# Patient Record
Sex: MALE | Race: WHITE | NOT HISPANIC OR LATINO | Employment: FULL TIME | URBAN - METROPOLITAN AREA
[De-identification: names, ages, dates, MRNs, and addresses within clinical notes are randomized per-mention and may not be internally consistent; named-entity substitution may affect disease eponyms.]

---

## 2023-12-11 ENCOUNTER — OFFICE VISIT (OUTPATIENT)
Dept: FAMILY MEDICINE | Facility: CLINIC | Age: 38
End: 2023-12-11
Payer: COMMERCIAL

## 2023-12-11 VITALS
BODY MASS INDEX: 16.11 KG/M2 | SYSTOLIC BLOOD PRESSURE: 123 MMHG | RESPIRATION RATE: 16 BRPM | HEIGHT: 78 IN | HEART RATE: 73 BPM | WEIGHT: 139.2 LBS | OXYGEN SATURATION: 97 % | DIASTOLIC BLOOD PRESSURE: 80 MMHG | TEMPERATURE: 98.4 F

## 2023-12-11 DIAGNOSIS — R30.0 DYSURIA: Primary | ICD-10-CM

## 2023-12-11 LAB
ALBUMIN UR-MCNC: NEGATIVE MG/DL
APPEARANCE UR: CLEAR
BILIRUB UR QL STRIP: NEGATIVE
COLOR UR AUTO: YELLOW
GLUCOSE UR STRIP-MCNC: NEGATIVE MG/DL
HGB UR QL STRIP: NEGATIVE
KETONES UR STRIP-MCNC: ABNORMAL MG/DL
LEUKOCYTE ESTERASE UR QL STRIP: NEGATIVE
NITRATE UR QL: NEGATIVE
PH UR STRIP: 6.5 [PH] (ref 5–8)
SP GR UR STRIP: 1.02 (ref 1–1.03)
UROBILINOGEN UR STRIP-ACNC: 0.2 E.U./DL

## 2023-12-11 PROCEDURE — 99203 OFFICE O/P NEW LOW 30 MIN: CPT | Mod: GC

## 2023-12-11 PROCEDURE — 81003 URINALYSIS AUTO W/O SCOPE: CPT

## 2023-12-11 PROCEDURE — 87086 URINE CULTURE/COLONY COUNT: CPT

## 2023-12-11 RX ORDER — PHENAZOPYRIDINE HYDROCHLORIDE 95 MG/1
190 TABLET ORAL 3 TIMES DAILY
COMMUNITY
End: 2023-12-13

## 2023-12-11 RX ORDER — SULFAMETHOXAZOLE/TRIMETHOPRIM 800-160 MG
1 TABLET ORAL 2 TIMES DAILY
COMMUNITY
End: 2023-12-13

## 2023-12-11 NOTE — PROGRESS NOTES
Preceptor Attestation:    I discussed the patient with the resident and evaluated the patient in person. I have verified the content of the note, which accurately reflects my assessment of the patient and the plan of care.   Supervising Physician:  Charles Kahn MD.

## 2023-12-11 NOTE — PROGRESS NOTES
"  Assessment & Plan     Dysuria  This problem started 10 days ago. It happened to follow a night of heavier drinking. No hx of UTIs. He is currently on day 3 of Bactrim DS prescribed over virtual visit and has not noticed any improvement. No discharge, fevers, or flank pain. Some abdominal tenderness on exam. Possibly has UTI. Also could be from urethral trauma as he is an avid cyclist. Plan to get UA with culture and continue with current course of antibiotics while results are pending.    - UA Macroscopic with reflex to Microscopic and Culture  - Urine Culture      Azeem Sol MD  [facility]    Reena Alves is a 38 year old, presenting for the following health issues:  establishing care (Establishing care for primary ) and UTI (Uti - taken meds 3 days where the symptoms have gotten worse - \"extreme frequency - in volunteerary leaking - a drop - no burning pain. When he moves there is some abdominal pressure where it squeezes something urine out. Wants to confirm if there is a UTI. )      12/11/2023     2:46 PM   Additional Questions   Roomed by AKIRA Lewis MA   Accompanied by Self       HPI       Genitourinary - Male  Onset/Duration: December 1st  Description:   Dysuria (painful urination): YES  Hematuria (blood in urine): No  Frequency: YES  Waking at night to urinate: YES  Hesitancy (delay in urine): No  Retention (unable to empty): No  Decrease in urinary flow: No  Incontinence: YES  Progression of Symptoms:  waxing and waning  Accompanying Signs & Symptoms:  Fever: No  Back/Flank pain: No  Urethral discharge: No  Testicle lumps/masses/pain: No  Nausea and/or vomiting: YES- nausea  Abdominal pain: YES  History:   History of frequent UTI s: No  History of kidney stones: No  History of hernias: No  Personal or Family history of Prostate problems: No  Sexually active: YES- female   Therapies tried and outcome: course of antibiotics - TMP-SMX DS and phenazopyridine           Objective  " "  /80   Pulse 73   Temp 98.4  F (36.9  C) (Oral)   Resp 16   Ht 1.981 m (6' 6\")   Wt 63.1 kg (139 lb 3.2 oz)   SpO2 97%   BMI 16.09 kg/m    Body mass index is 16.09 kg/m .  Physical Exam   GENERAL: healthy, alert and no distress  ABDOMEN: soft, nontender, without hepatosplenomegaly or masses and tenderness LUQ   (male): normal male genitalia without lesions or urethral discharge, no hernia    Results for orders placed or performed in visit on 12/11/23 (from the past 24 hour(s))   UA Macroscopic with reflex to Microscopic and Culture    Specimen: Urine, Midstream   Result Value Ref Range    Color Urine Yellow Colorless, Straw, Light Yellow, Yellow    Appearance Urine Clear Clear    Glucose Urine Negative Negative mg/dL    Bilirubin Urine Negative Negative    Ketones Urine Trace (A) Negative mg/dL    Specific Gravity Urine 1.025 1.005 - 1.030    Blood Urine Negative Negative    pH Urine 6.5 5.0 - 8.0    Protein Albumin Urine Negative Negative mg/dL    Urobilinogen Urine 0.2 0.2, 1.0 E.U./dL    Nitrite Urine Negative Negative    Leukocyte Esterase Urine Negative Negative    Narrative    Microscopic not indicated       ----- Service Performed and Documented by Resident or Fellow ------              "

## 2023-12-13 ENCOUNTER — OFFICE VISIT (OUTPATIENT)
Dept: FAMILY MEDICINE | Facility: CLINIC | Age: 38
End: 2023-12-13
Payer: COMMERCIAL

## 2023-12-13 VITALS
DIASTOLIC BLOOD PRESSURE: 76 MMHG | HEART RATE: 88 BPM | WEIGHT: 141 LBS | SYSTOLIC BLOOD PRESSURE: 126 MMHG | TEMPERATURE: 97 F | OXYGEN SATURATION: 99 % | BODY MASS INDEX: 16.31 KG/M2 | HEIGHT: 78 IN

## 2023-12-13 DIAGNOSIS — R11.0 NAUSEA: ICD-10-CM

## 2023-12-13 DIAGNOSIS — Z13.220 LIPID SCREENING: ICD-10-CM

## 2023-12-13 DIAGNOSIS — Z11.4 ENCOUNTER FOR SCREENING FOR HIV: ICD-10-CM

## 2023-12-13 DIAGNOSIS — R35.0 URINARY FREQUENCY: ICD-10-CM

## 2023-12-13 DIAGNOSIS — Z11.59 NEED FOR HEPATITIS C SCREENING TEST: ICD-10-CM

## 2023-12-13 DIAGNOSIS — Z11.59 NEED FOR HEPATITIS B SCREENING TEST: ICD-10-CM

## 2023-12-13 DIAGNOSIS — R63.0 DECREASED APPETITE: ICD-10-CM

## 2023-12-13 DIAGNOSIS — R10.12 ABDOMINAL PAIN, LEFT UPPER QUADRANT: Primary | ICD-10-CM

## 2023-12-13 DIAGNOSIS — E04.9 ENLARGED THYROID GLAND: ICD-10-CM

## 2023-12-13 LAB
BACTERIA UR CULT: NO GROWTH
BASOPHILS # BLD AUTO: 0 10E3/UL (ref 0–0.2)
BASOPHILS NFR BLD AUTO: 1 %
EOSINOPHIL # BLD AUTO: 0.1 10E3/UL (ref 0–0.7)
EOSINOPHIL NFR BLD AUTO: 1 %
ERYTHROCYTE [DISTWIDTH] IN BLOOD BY AUTOMATED COUNT: 12.1 % (ref 10–15)
HCT VFR BLD AUTO: 44 % (ref 40–53)
HGB BLD-MCNC: 15.3 G/DL (ref 13.3–17.7)
IMM GRANULOCYTES # BLD: 0 10E3/UL
IMM GRANULOCYTES NFR BLD: 0 %
LYMPHOCYTES # BLD AUTO: 1.1 10E3/UL (ref 0.8–5.3)
LYMPHOCYTES NFR BLD AUTO: 17 %
MCH RBC QN AUTO: 30.7 PG (ref 26.5–33)
MCHC RBC AUTO-ENTMCNC: 34.8 G/DL (ref 31.5–36.5)
MCV RBC AUTO: 88 FL (ref 78–100)
MONOCYTES # BLD AUTO: 0.5 10E3/UL (ref 0–1.3)
MONOCYTES NFR BLD AUTO: 7 %
NEUTROPHILS # BLD AUTO: 4.8 10E3/UL (ref 1.6–8.3)
NEUTROPHILS NFR BLD AUTO: 73 %
PLATELET # BLD AUTO: 221 10E3/UL (ref 150–450)
RBC # BLD AUTO: 4.99 10E6/UL (ref 4.4–5.9)
WBC # BLD AUTO: 6.6 10E3/UL (ref 4–11)

## 2023-12-13 PROCEDURE — 86376 MICROSOMAL ANTIBODY EACH: CPT

## 2023-12-13 PROCEDURE — 87389 HIV-1 AG W/HIV-1&-2 AB AG IA: CPT

## 2023-12-13 PROCEDURE — 86706 HEP B SURFACE ANTIBODY: CPT

## 2023-12-13 PROCEDURE — 99214 OFFICE O/P EST MOD 30 MIN: CPT | Mod: GC

## 2023-12-13 PROCEDURE — 86800 THYROGLOBULIN ANTIBODY: CPT

## 2023-12-13 PROCEDURE — 84436 ASSAY OF TOTAL THYROXINE: CPT

## 2023-12-13 PROCEDURE — 36415 COLL VENOUS BLD VENIPUNCTURE: CPT

## 2023-12-13 PROCEDURE — 80053 COMPREHEN METABOLIC PANEL: CPT

## 2023-12-13 PROCEDURE — 84480 ASSAY TRIIODOTHYRONINE (T3): CPT

## 2023-12-13 PROCEDURE — 86803 HEPATITIS C AB TEST: CPT

## 2023-12-13 PROCEDURE — 80061 LIPID PANEL: CPT

## 2023-12-13 PROCEDURE — 83690 ASSAY OF LIPASE: CPT

## 2023-12-13 PROCEDURE — 84443 ASSAY THYROID STIM HORMONE: CPT

## 2023-12-13 PROCEDURE — 86140 C-REACTIVE PROTEIN: CPT

## 2023-12-13 PROCEDURE — 85025 COMPLETE CBC W/AUTO DIFF WBC: CPT

## 2023-12-13 RX ORDER — FAMOTIDINE 20 MG/1
20 TABLET, FILM COATED ORAL 2 TIMES DAILY PRN
Qty: 30 TABLET | Refills: 0 | Status: SHIPPED | OUTPATIENT
Start: 2023-12-13 | End: 2024-03-20

## 2023-12-13 RX ORDER — CALCIUM CARBONATE 500 MG/1
2 TABLET, CHEWABLE ORAL 2 TIMES DAILY
Qty: 60 TABLET | Refills: 0 | Status: SHIPPED | OUTPATIENT
Start: 2023-12-13 | End: 2024-03-20

## 2023-12-13 NOTE — PROGRESS NOTES
Assessment & Plan     Urinary frequency  Abdominal pain, left upper quadrant  Nausea  Decreased appetite  Enlarged thyroid gland  Wide differential including but not limited to autoimmune thyroid etiology, GERD, gastritis, PUD, pancreatitis, with possible component of anemia given mucosal pallor on exam.  Will obtain workup as below, and given enlarged firm thyroid we will order thyroid US and obtain thyroiditis panel.  Do not suspect UTI given antibiotic treatment and negative UA, do not suspect STD as patient firmly denies exposure or new partners.  Do not suspect nephrolithiasis given lack of findings on UA and on exam.  Afebrile, vitally stable.  Provided patient with Tums and Pepto to use for symptomatic relief, and if those do not help then to trial famotidine.  Instructed patient with return precautions including worsening symptoms, fevers, and worsening abdominal pain and inability to have BM.  - CBC with Platelets & Differential; Future  - Comprehensive metabolic panel; Future  - Lipase; Future  - Helicobacter pylori Antigen Stool; Future  - calcium carbonate (TUMS) 500 MG chewable tablet; Take 2 tablets (1,000 mg) by mouth 2 times daily  - bismuth subsalicylate (PEPTO BISMOL) 262 MG/15ML suspension; Take 15 mLs by mouth every 6 hours as needed for indigestion  - famotidine (PEPCID) 20 MG tablet; Take 1 tablet (20 mg) by mouth 2 times daily as needed (As needed for abdominal symptoms)  - Helicobacter pylori Antigen Stool  - Lipase  - Comprehensive metabolic panel  - CBC with Platelets & Differential    Encounter for screening for HIV  - HIV Ag/Ab Screen Cascade    Need for hepatitis B screening test  - Hepatitis B Surface Antibody    Lipid screening  - Lipid panel reflex to direct LDL Non-fasting; Future  - Lipid panel reflex to direct LDL Non-fasting    Need for hepatitis C screening test  - Hepatitis C Screen Reflex to HCV RNA Quant and Genotype    Follow Ups:  Follow-up in approximately 1 month,  "follow-up to be guided by results of initial workup.      Tyler Johnson DO  [facility] ROSS Alves is a 38 year old, presenting for the following health issues:  UTI (On going uti symtoms. Nausea, urge to pee but nothing comes out, lots of pressure.)        12/13/2023     1:38 PM   Additional Questions   Roomed by ramirez   Accompanied by self       HPI   Patient is a 38-year-old male with no pertinent past medical history presenting for 12 days of increased urinary frequency, abdominal pain, nausea, chills, loose stools.  He additionally complains of urinary incontinence over this timeframe as well.    He was recently seen in clinic on 12/11/2023 for urinary frequency urinary urgency and what had already been on Bactrim therapy at that time.  UA was obtained which was grossly negative for infection and negative for growth on culture.    He states that for the past 12 days he has had urinary frequency, urinary urgency, abdominal pain described as a dull ache, increased sweatiness and shakiness, chills, nausea, and loose stools.  Unsure if there is any temporal association to meals.  Feels worse when he lays down.  Denies any burning sensation in the chest.  He also some very mild urinary continence that occurs when he bends forward or coughs but that it is never more than \"one drop\"    Denies new sexual partners.  Denies dysuria, testicular pain,  sores or lesions, hematuria, suprapubic tenderness, or CVA tenderness.  No fevers.    He feels that he has had some decreased appetite as well, and feels that he is disinterested in eating.  His stools are slightly worse and he feels that after going to the bathroom he needs to be careful or else he may have an episode of fecal incontinence 2/2 the urge to defecate.     He uses marijuana daily, uses a nicotine vape, and has 1 beer a night during the weekdays and on the weekends can vary from no drinking to heavy drinking.  Has been " "decreasing his marijuana use, nicotine use, and alcohol use 2/2 the symptoms he has been feeling.    Of note, he was asking his family regarding their medical history, and discovered that his mom and mom's brother both have Graves' disease, and 2 of his cousins on his mother side both have Hashimoto's thyroiditis.      Review of Systems   Constitutional, HEENT, cardiovascular, pulmonary, gi and gu systems are negative, except as otherwise noted.      Objective    /76 (BP Location: Left arm, Patient Position: Sitting, Cuff Size: Adult Regular)   Pulse 88   Temp 97  F (36.1  C) (Tympanic)   Ht 1.981 m (6' 5.99\")   Wt 64 kg (141 lb)   SpO2 99%   BMI 16.30 kg/m    Body mass index is 16.3 kg/m .  Physical Exam   Constitutional: Awake, alert, cooperative, no apparent distress, and appears stated age.  Eyes: Lids and lashes normal, sclera clear.  Mild conjunctival pallor.  ENT: Normocephalic, atraumatic. Moist mucous membranes.  Thyroid nontender to palpation.  Thyroid mildly enlarged and firm, moves easily with swallowing.  Respiratory: No increased work of breathing, no accessory muscle use, clear to auscultation bilaterally, no crackles or wheezing.  Cardiovascular: Regular rate and rhythm, normal S1 and S2, and no murmur noted  GI: Tenderness to palpation LUQ, periumbilical region.  Abdomen soft, nondistended, without rigidity.  No peritoneal signs.  Negative Belcher's and Kenney's.  Negative Carnett.  Skin: No bruising or bleeding.  Reddened skin to the anterior neck.  Musculoskeletal: There is no redness, warmth, or swelling of the joints.  Full range of motion noted.      "

## 2023-12-13 NOTE — PROGRESS NOTES
Preceptor Attestation:    I discussed the patient with the resident and evaluated the patient in person. I have verified the content of the note, which accurately reflects my assessment of the patient and the plan of care.   Supervising Physician:  Ruperto Evans MD.        BP Readings from Last 3 Encounters:   12/13/23 126/76   12/11/23 123/80    Wt Readings from Last 3 Encounters:   12/13/23 64 kg (141 lb)   12/11/23 63.1 kg (139 lb 3.2 oz)

## 2023-12-13 NOTE — PATIENT INSTRUCTIONS
Thank you for visiting our clinic today. As we discussed;    1) Try taking tums (1000mg as needed twice per day) and pepto bismol   2) If the above doesn't help, try taking famotidine 20mg once per day  3) Please be seen in clinic or in the ER if symptoms worsen, fevers develop, or abdominal pain worsens and you are not having bowel movements.  4) You can  OTC Gas-X (simethicone) for any bloating or burping.    Please feel free to call the clinic with any questions or concerns at [phone], or send a Qriously message and we will get back to you as soon as we can.

## 2023-12-14 LAB
ALBUMIN SERPL BCG-MCNC: 5.1 G/DL (ref 3.5–5.2)
ALP SERPL-CCNC: 46 U/L (ref 40–150)
ALT SERPL W P-5'-P-CCNC: 18 U/L (ref 0–70)
ANION GAP SERPL CALCULATED.3IONS-SCNC: 9 MMOL/L (ref 7–15)
AST SERPL W P-5'-P-CCNC: 18 U/L (ref 0–45)
BILIRUB SERPL-MCNC: 0.5 MG/DL
BUN SERPL-MCNC: 13.5 MG/DL (ref 6–20)
CALCIUM SERPL-MCNC: 9.7 MG/DL (ref 8.6–10)
CHLORIDE SERPL-SCNC: 103 MMOL/L (ref 98–107)
CHOLEST SERPL-MCNC: 166 MG/DL
CREAT SERPL-MCNC: 0.95 MG/DL (ref 0.67–1.17)
CRP SERPL-MCNC: <3 MG/L
DEPRECATED HCO3 PLAS-SCNC: 27 MMOL/L (ref 22–29)
EGFRCR SERPLBLD CKD-EPI 2021: >90 ML/MIN/1.73M2
FASTING STATUS PATIENT QL REPORTED: NORMAL
GLUCOSE SERPL-MCNC: 90 MG/DL (ref 70–99)
HBV SURFACE AB SERPL IA-ACNC: 33.12 M[IU]/ML
HBV SURFACE AB SERPL IA-ACNC: REACTIVE M[IU]/ML
HCV AB SERPL QL IA: NONREACTIVE
HDLC SERPL-MCNC: 64 MG/DL
HIV 1+2 AB+HIV1 P24 AG SERPL QL IA: NONREACTIVE
LDLC SERPL CALC-MCNC: 91 MG/DL
LIPASE SERPL-CCNC: 33 U/L (ref 13–60)
NONHDLC SERPL-MCNC: 102 MG/DL
POTASSIUM SERPL-SCNC: 4.6 MMOL/L (ref 3.4–5.3)
PROT SERPL-MCNC: 7.9 G/DL (ref 6.4–8.3)
SODIUM SERPL-SCNC: 139 MMOL/L (ref 135–145)
T3 SERPL-MCNC: 82 NG/DL (ref 85–202)
T4 SERPL-MCNC: 7.7 UG/DL (ref 4.5–11.7)
THYROGLOB AB SERPL IA-ACNC: <20 IU/ML
THYROPEROXIDASE AB SERPL-ACNC: <10 IU/ML
TRIGL SERPL-MCNC: 55 MG/DL
TSH SERPL DL<=0.005 MIU/L-ACNC: 1.12 UIU/ML (ref 0.3–4.2)

## 2023-12-15 PROCEDURE — 87338 HPYLORI STOOL AG IA: CPT

## 2023-12-18 ENCOUNTER — LAB (OUTPATIENT)
Dept: LAB | Facility: CLINIC | Age: 38
End: 2023-12-18
Payer: COMMERCIAL

## 2023-12-18 DIAGNOSIS — R35.0 URINARY FREQUENCY: ICD-10-CM

## 2023-12-18 DIAGNOSIS — R10.12 ABDOMINAL PAIN, LEFT UPPER QUADRANT: ICD-10-CM

## 2023-12-18 DIAGNOSIS — R19.7 DIARRHEA, UNSPECIFIED TYPE: ICD-10-CM

## 2023-12-18 DIAGNOSIS — R19.7 DIARRHEA, UNSPECIFIED TYPE: Primary | ICD-10-CM

## 2023-12-18 LAB — H PYLORI AG STL QL IA: NEGATIVE

## 2023-12-19 LAB
ADV 40+41 DNA STL QL NAA+NON-PROBE: NEGATIVE
ALBUMIN UR-MCNC: NEGATIVE MG/DL
APPEARANCE UR: CLEAR
ASTRO TYP 1-8 RNA STL QL NAA+NON-PROBE: NEGATIVE
BACTERIA #/AREA URNS HPF: ABNORMAL /HPF
BILIRUB UR QL STRIP: NEGATIVE
C CAYETANENSIS DNA STL QL NAA+NON-PROBE: NEGATIVE
C DIFF TOX B STL QL: NEGATIVE
CAMPYLOBACTER DNA SPEC NAA+PROBE: NEGATIVE
CAOX CRY #/AREA URNS HPF: ABNORMAL /HPF
COLOR UR AUTO: YELLOW
CRYPTOSP DNA STL QL NAA+NON-PROBE: NEGATIVE
E COLI O157 DNA STL QL NAA+NON-PROBE: NORMAL
E HISTOLYT DNA STL QL NAA+NON-PROBE: NEGATIVE
EAEC ASTA GENE ISLT QL NAA+PROBE: NEGATIVE
EC STX1+STX2 GENES STL QL NAA+NON-PROBE: NEGATIVE
EPEC EAE GENE STL QL NAA+NON-PROBE: NEGATIVE
ETEC LTA+ST1A+ST1B TOX ST NAA+NON-PROBE: NEGATIVE
G LAMBLIA DNA STL QL NAA+NON-PROBE: NEGATIVE
GLUCOSE UR STRIP-MCNC: NEGATIVE MG/DL
HGB UR QL STRIP: NEGATIVE
KETONES UR STRIP-MCNC: NEGATIVE MG/DL
LEUKOCYTE ESTERASE UR QL STRIP: NEGATIVE
MUCOUS THREADS #/AREA URNS LPF: PRESENT /LPF
NITRATE UR QL: NEGATIVE
NOROVIRUS GI+II RNA STL QL NAA+NON-PROBE: NEGATIVE
P SHIGELLOIDES DNA STL QL NAA+NON-PROBE: NEGATIVE
PH UR STRIP: 6 [PH] (ref 5–8)
RBC #/AREA URNS AUTO: ABNORMAL /HPF
RVA RNA STL QL NAA+NON-PROBE: NEGATIVE
SALMONELLA SP RPOD STL QL NAA+PROBE: NEGATIVE
SAPO I+II+IV+V RNA STL QL NAA+NON-PROBE: NEGATIVE
SHIGELLA SP+EIEC IPAH ST NAA+NON-PROBE: NEGATIVE
SP GR UR STRIP: >=1.03 (ref 1–1.03)
SQUAMOUS #/AREA URNS AUTO: ABNORMAL /LPF
UROBILINOGEN UR STRIP-ACNC: 0.2 E.U./DL
V CHOLERAE DNA SPEC QL NAA+PROBE: NEGATIVE
VIBRIO DNA SPEC NAA+PROBE: NEGATIVE
WBC #/AREA URNS AUTO: ABNORMAL /HPF
Y ENTEROCOL DNA STL QL NAA+PROBE: NEGATIVE

## 2023-12-19 PROCEDURE — 87507 IADNA-DNA/RNA PROBE TQ 12-25: CPT

## 2023-12-19 PROCEDURE — 87493 C DIFF AMPLIFIED PROBE: CPT | Mod: 59

## 2023-12-19 PROCEDURE — 87086 URINE CULTURE/COLONY COUNT: CPT

## 2023-12-19 PROCEDURE — 81001 URINALYSIS AUTO W/SCOPE: CPT

## 2023-12-20 LAB — BACTERIA UR CULT: NO GROWTH

## 2023-12-22 DIAGNOSIS — R10.84 ABDOMINAL PAIN, GENERALIZED: ICD-10-CM

## 2023-12-22 DIAGNOSIS — N39.3 STRESS INCONTINENCE OF URINE: Primary | ICD-10-CM

## 2023-12-27 ENCOUNTER — OFFICE VISIT (OUTPATIENT)
Dept: FAMILY MEDICINE | Facility: CLINIC | Age: 38
End: 2023-12-27
Payer: COMMERCIAL

## 2023-12-27 ENCOUNTER — HOSPITAL ENCOUNTER (OUTPATIENT)
Dept: ULTRASOUND IMAGING | Facility: CLINIC | Age: 38
Discharge: HOME | End: 2023-12-27
Attending: FAMILY MEDICINE | Admitting: FAMILY MEDICINE
Payer: COMMERCIAL

## 2023-12-27 VITALS
HEIGHT: 73 IN | HEART RATE: 59 BPM | DIASTOLIC BLOOD PRESSURE: 80 MMHG | TEMPERATURE: 97.6 F | BODY MASS INDEX: 18.87 KG/M2 | SYSTOLIC BLOOD PRESSURE: 123 MMHG | OXYGEN SATURATION: 100 % | RESPIRATION RATE: 18 BRPM | WEIGHT: 142.4 LBS

## 2023-12-27 DIAGNOSIS — N41.0 PROSTATITIS, ACUTE: Primary | ICD-10-CM

## 2023-12-27 DIAGNOSIS — R10.2 PERINEAL PAIN IN MALE: ICD-10-CM

## 2023-12-27 DIAGNOSIS — E04.9 ENLARGED THYROID GLAND: ICD-10-CM

## 2023-12-27 DIAGNOSIS — R30.0 DYSURIA: ICD-10-CM

## 2023-12-27 PROCEDURE — 99214 OFFICE O/P EST MOD 30 MIN: CPT | Mod: GC

## 2023-12-27 PROCEDURE — 76536 US EXAM OF HEAD AND NECK: CPT

## 2023-12-27 RX ORDER — CIPROFLOXACIN 500 MG/1
500 TABLET, FILM COATED ORAL 2 TIMES DAILY
Qty: 56 TABLET | Refills: 0 | Status: SHIPPED | OUTPATIENT
Start: 2023-12-27 | End: 2024-03-20

## 2023-12-27 RX ORDER — CIPROFLOXACIN 500 MG/1
500 TABLET, FILM COATED ORAL 2 TIMES DAILY
Qty: 56 TABLET | Refills: 0 | Status: SHIPPED | OUTPATIENT
Start: 2023-12-27 | End: 2023-12-27

## 2023-12-27 NOTE — PROGRESS NOTES
Assessment & Plan     Prostatitis, acute  Dysuria  Perineal pain in male  Symptoms consistent with prostatitis, see digital rectal exam findings below.  Will begin treatment with ciprofloxacin for 4 weeks and then have patient follow-up to reevaluate symptoms.  Previous workup has been largely negative, and although there was few calcium oxalate crystals on his previous UA I do not currently suspect nephrolithiasis as a cause for his symptoms.  Will still have patient schedule follow-up with urology should antibiotic treatment fail.  - Adult Urology  Referral; Future  - ciprofloxacin (CIPRO) 500 MG tablet; Take 1 tablet (500 mg) by mouth 2 times daily      Return in about 4 weeks (around 1/24/2024) for Follow up for prostatitis.    Tyler Johnson [facility]    Reena Alves is a 38 year old, presenting for the following health issues:  Results (F/U)      12/27/2023     4:10 PM   Additional Questions   Roomed by ANSLEY   Accompanied by self       HPI     Patient is a 38-year-old male returning to clinic for follow-up of urinary urgency/frequency, abdominal pain, nausea, and new onset urinary incontinence.  Symptoms started 12/1/2023.  Initially started as urinary urgency and increased urinary frequency, had a UA performed that was negative for infection was treated empirically with Bactrim.    Followed up on 12/13/2023 where he began complaining of new GI and systemic symptoms including night sweats, loose stools, nausea, decreased appetite, and reported strong family history of graves disease and Hashimoto's thyroiditis on the maternal side of his family.  Workup obtained at that time was negative for any infectious process or any evidence of thyroid disease.  Thyroid ultrasound ordered and was normal.    He returns today and states that some of his symptoms have improved -he is no longer feeling nauseous and appetite is improved, his abdominal pain has improved and is no  "longer constant, comes and goes, and he more so describes as a pressure.  Night sweats and general malaise have resolved as well.    He is still complaining of increased urinary frequency and urinary urgency.  Additionally, he now complains of a fullness and tenderness to his perennial area that is worsened when he is sitting or laying down.  Still having urinary incontinence, worse with with position changes, with dribbling and has had to place an incontinence pad in his underwear for this.  Describes the pain and swelling in his perineum as if \"there is a golf ball sitting there\".  No fevers or chills.  No hematuria, testicular pain, suprapubic tenderness, CVA tenderness.    Review of Systems   Constitutional, HEENT, cardiovascular, pulmonary, gi and gu systems are negative, except as otherwise noted.      Objective    /80 (BP Location: Left arm, Patient Position: Sitting, Cuff Size: Adult Regular)   Pulse 59   Temp 97.6  F (36.4  C) (Oral)   Resp 18   Ht 1.854 m (6' 1\")   Wt 64.6 kg (142 lb 6.4 oz)   SpO2 100%   BMI 18.79 kg/m    Body mass index is 18.79 kg/m .  Physical Exam   Constitutional: Awake, alert, cooperative, no apparent distress, and appears stated age.  Eyes: Lids and lashes normal, sclera clear, conjunctiva normal.  ENT: Normocephalic, atraumatic. Moist mucous membranes.  Respiratory: No increased work of breathing, no accessory muscle use, clear to auscultation bilaterally, no crackles or wheezing.  Cardiovascular: Regular rate and rhythm, normal S1 and S2, no S3 or S4, and no murmur noted  GI: Abdomen soft, non-tender, non-distended, no masses palpated. Bowel sounds present.  Skin: No bruising or bleeding and normal skin color, texture, turgor.    Digital Rectal Exam: Boggy, enlarged prostate that is tender to palpation.            "

## 2023-12-27 NOTE — PROGRESS NOTES
Preceptor Attestation:    I discussed the patient with the resident and evaluated the patient in person. I have verified the content of the note, which accurately reflects my assessment of the patient and the plan of care.   Supervising Physician:  Ever Mahoney MD.

## 2023-12-31 ENCOUNTER — HEALTH MAINTENANCE LETTER (OUTPATIENT)
Age: 38
End: 2023-12-31

## 2024-01-03 DIAGNOSIS — R30.0 DYSURIA: ICD-10-CM

## 2024-01-03 DIAGNOSIS — N41.0 PROSTATITIS, ACUTE: Primary | ICD-10-CM

## 2024-01-03 RX ORDER — TAMSULOSIN HYDROCHLORIDE 0.4 MG/1
0.4 CAPSULE ORAL DAILY
Qty: 30 CAPSULE | Refills: 0 | Status: SHIPPED | OUTPATIENT
Start: 2024-01-03 | End: 2024-03-15

## 2024-01-23 ENCOUNTER — OFFICE VISIT (OUTPATIENT)
Dept: FAMILY MEDICINE | Facility: CLINIC | Age: 39
End: 2024-01-23
Payer: COMMERCIAL

## 2024-01-23 VITALS
DIASTOLIC BLOOD PRESSURE: 78 MMHG | HEART RATE: 61 BPM | RESPIRATION RATE: 16 BRPM | WEIGHT: 147.2 LBS | TEMPERATURE: 97.8 F | BODY MASS INDEX: 19.51 KG/M2 | SYSTOLIC BLOOD PRESSURE: 117 MMHG | HEIGHT: 73 IN | OXYGEN SATURATION: 99 %

## 2024-01-23 DIAGNOSIS — Z23 NEED FOR PROPHYLACTIC VACCINATION AND INOCULATION AGAINST INFLUENZA: ICD-10-CM

## 2024-01-23 DIAGNOSIS — N41.0 PROSTATITIS, ACUTE: Primary | ICD-10-CM

## 2024-01-23 DIAGNOSIS — N39.41 URGE INCONTINENCE OF URINE: ICD-10-CM

## 2024-01-23 DIAGNOSIS — R35.0 URINARY FREQUENCY: ICD-10-CM

## 2024-01-23 PROCEDURE — 99213 OFFICE O/P EST LOW 20 MIN: CPT | Mod: 25

## 2024-01-23 PROCEDURE — 90480 ADMN SARSCOV2 VAC 1/ONLY CMP: CPT

## 2024-01-23 PROCEDURE — 90686 IIV4 VACC NO PRSV 0.5 ML IM: CPT

## 2024-01-23 PROCEDURE — 91320 SARSCV2 VAC 30MCG TRS-SUC IM: CPT

## 2024-01-23 PROCEDURE — 90471 IMMUNIZATION ADMIN: CPT

## 2024-01-23 NOTE — PROGRESS NOTES
"Preceptor attestation:  Vital signs reviewed: /78   Pulse 61   Temp 97.8  F (36.6  C) (Oral)   Resp 16   Ht 1.854 m (6' 1\")   Wt 66.8 kg (147 lb 3.2 oz)   SpO2 99%   BMI 19.42 kg/m      Patient seen, evaluated, and discussed with the resident.  I verified the content of the note, which accurately reflects my assessment of the patient and the plan of care.    Supervising physician: Michelle Hinton MD  [facility]  "

## 2024-01-23 NOTE — PROGRESS NOTES
Assessment & Plan     Prostatitis, acute  Incontinence of urine  Urinary frequency  Patient's symptoms and previous digital rectal exam seemed suspicious of acute prostatitis which has now been treated with 4 weeks of ciprofloxacin. He has had improvement of the systemic symptoms of subjective fevers, chills and nausea. His urinary symptoms of urgency and intermittent incontinence especially with position changes continue but are somewhat improved since last visit. Additionally patient reports that some of his urinary symptoms/incontinence has a chronic component to it ongoing for many years. Because we have seen some improvement of his symptoms over the past month especially the more systemic ones, it seems as though the infectious component of the disease may have been treated. There is still concern that a structural issue within the urethra/bladder may be playing a factor especially because the patient endorses some chronic component to his symptoms. He has a follow up with urology next month for further evaluation. He will finish his course of ciprofloxacin and take tamulosin for symptom relief in the meantime.     Need for prophylactic vaccination and inoculation against influenza  COVID and flu vaccinations given.      Derek Godfrey MS3    Resident/Fellow Attestation   I, Tyler Johnson DO, was present with the medical/GUI student who participated in the service and in the documentation of the note.  I have verified the history and personally performed the physical exam and medical decision making.  I agree with the assessment and plan of care as documented in the note.        Tyler Johnson DO  PGY2  Date of Service (when I saw the patient): 01/23/24       Reena Alves is a 38 year old, presenting for the following health issues:  Follow Up (Follow up last visit for prostatitis antibodies med)        1/23/2024     8:10 AM   Additional Questions   Roomed by radha   Accompanied by self     LUPE Alves  "is a 38 y.o. male presenting to clinic for acute prostatitis follow up. His acute symptoms started at the beginning of December 2023 with significant urinary urgency and frequency, abdominal fullness, signs of overflow incontinence (leakage of urine with with positional changes) as well as systemic symptoms of subjective fevers, chills, nausea, abdominal discomfort and loose stools. His UA was negative and he had a digital rectal exam that revealed a tender and enlarged prostate. He was prescribed ciprofloxacin which he has been taking as prescribed. He is now feeling improvement of symptoms with no other systemic fevers, chills or persistent nausea, but continues to have urinary urgency/frequency and incontinence with little dribbles of urine especially when sitting or with position changes. He continues to feel lower abdominal fullness but states that the rectal/perineal fullness is improved but not resolved. He has not been taking his tamulosin due to no wanting to mask symptoms.    Additionally the patient mentions that he believes some of his symptoms of incontinence and urgency have been ongoing for many years stating that in the past prior to this acute episode he would often have significant urinary urgency after having a bowel movement and occasionally would have incontinence with dribbling of urine. His acute symptoms were much more prominent, but states this has been ongoing for some time.        Objective    /78   Pulse 61   Temp 97.8  F (36.6  C) (Oral)   Resp 16   Ht 1.854 m (6' 1\")   Wt 66.8 kg (147 lb 3.2 oz)   SpO2 99%   BMI 19.42 kg/m    Body mass index is 19.42 kg/m .  Physical Exam   GENERAL: alert and no distress  RESP: lungs clear to auscultation - no rales, rhonchi or wheezes  CV: regular rate and rhythm, normal S1 S2, no S3 or S4, no murmur, click or rub, no peripheral edema  ABDOMEN: soft, nontender, no hepatosplenomegaly, no masses and bowel sounds normal  MS: no gross " musculoskeletal defects noted, no edema              Signed Electronically by: Tyler Johnson,

## 2024-01-25 NOTE — TELEPHONE ENCOUNTER
MEDICAL RECORDS REQUEST   [facility]  Urology Clinic  [address] [place] [address]  PHONE: [phone]  Fax: [phone]        FUTURE VISIT INFORMATION                                                   [place], : 1985 scheduled for future visit at [facility]    APPOINTMENT INFORMATION:  Date: 2024  Provider:  Gusatvo Goins PA-C  Reason for Visit/Diagnosis: Clinical suspicion for prostatitis, treating with cipro    REFERRAL INFORMATION:  Referring provider:  Ever Mahoney MD in [facility]      RECORDS REQUESTED FOR VISIT                                                     NOTES  STATUS/DETAILS   OFFICE NOTE from other specialist  yes, 2024, 2023 -- Tyler Johnson DO @ [facility]   MEDICATION LIST  yes   LABS     URINALYSIS (UA)  yes   IMAGES  no     PRE-VISIT CHECKLIST      Joint diagnostic appointment coordinated correctly          (ensure right order & amount of time) Yes   RECORD COLLECTION COMPLETE Yes

## 2024-01-26 NOTE — PROGRESS NOTES
"Subjective     REQUESTING PROVIDER  Dr. Ever Mahoney MD    REASON FOR VISIT  Irritative lower urinary tract symptoms    HISTORY OF PRESENT ILLNESS  Mr. Dixon is a pleasant 38 year old male with no significant past medical history who presents today for chronic bothersome lower urinary tract symptoms.  I personally reviewed the family practice visit notes from 12/13/2023, 12/27/2023, and 1/23/2024 in preparation for today's visit.    According to those notes, Long endorsed a worsening of his baseline somewhat bothersome irritative lower urinary tract symptoms alongside subjective fever, pain with urination, and perineal discomfort.  Prostate exam completed on 12/27/2023 showed a boggy enlarged prostate that was tender to palpation.  With this in mind he was put on a 4-week course of ciprofloxacin, and endorses improvement in a good number of his urinary symptoms.  However, at his visit on 1/23/2024 he noted that he had some chronic urinary symptoms he was hoping to discuss so he was referred to urology.    Today:  First symptoms starting in mid 20s - post defecation pelvic pain and post-void dribbling  Post void dribbling really only happened if he sat after a bowel movement   Worsening nocturia is what led to the diagnosis of prostatitis   Biggest complaint now is constant urge to urinate and the above mentioned post-bowel movement symptoms   Some pressure of the suprapubic area now   Lots of GI upset after the 4 week course of antibiotics   Fluids:  Coffee: 4 oz  Water: 40 - 60 oz   Occasional cocktail   Denies any regular pelvic pain  Endorses that ejaculation can cause a \"twitch\" in his right leg  No gross hematuria     REVIEW OF SYSTEMS  Review of Systems   Constitutional:  Negative for activity change and unexpected weight change.   HENT:  Negative for ear pain and tinnitus.    Eyes:  Negative for visual disturbance.   Respiratory:  Negative for shortness of breath.    Cardiovascular:  Negative for chest " pain.   Gastrointestinal:  Positive for abdominal pain (More of a bloating or pressure feeling).   Genitourinary:  Negative for hematuria.   Musculoskeletal:  Negative for back pain (Chronic in nature).   Neurological:  Negative for dizziness and light-headedness.   Hematological:  Negative for adenopathy.   Psychiatric/Behavioral:  Negative for sleep disturbance.      Social History:  Quit smoking 2 years ago, stopped vaping a few weeks ago    Family History:  Denies any known family history of urologic malignancy     Objective     PHYSICAL EXAM  Physical Exam  Constitutional:       Appearance: Normal appearance.   HENT:      Head: Normocephalic and atraumatic.      Nose: No congestion.      Mouth/Throat:      Mouth: Mucous membranes are moist.   Eyes:      General:         Right eye: No discharge.         Left eye: No discharge.   Pulmonary:      Effort: No respiratory distress.   Abdominal:      General: There is no distension.   Musculoskeletal:         General: No deformity.   Skin:     General: Skin is warm and dry.   Neurological:      Mental Status: He is alert and oriented to person, place, and time.   Psychiatric:         Mood and Affect: Mood normal.         Behavior: Behavior normal.       LABORATORY   Latest Reference Range & Units 12/19/23 06:15   Color Urine Colorless, Straw, Light Yellow, Yellow  Yellow   Appearance Urine Clear  Clear   Glucose Urine Negative mg/dL Negative   Bilirubin Urine Negative  Negative   Ketones Urine Negative mg/dL Negative   Specific Gravity Urine 1.005 - 1.030  >=1.030   pH Urine 5.0 - 8.0  6.0   Protein Albumin Urine Negative mg/dL Negative   Urobilinogen Urine 0.2, 1.0 E.U./dL 0.2   Nitrite Urine Negative  Negative   Blood Urine Negative  Negative   Leukocyte Esterase Urine Negative  Negative   WBC Urine 0-5 /HPF /HPF 0-5   RBC Urine 0-2 /HPF /HPF None Seen   Bacteria Urine None Seen /HPF None Seen   Squamous Epithelial /LPF Urine None Seen /LPF Few !   Mucus Urine None  Seen /LPF Present !   Calcium Oxalate None Seen /HPF Few !   !: Data is abnormal    Urine culture associated with above urinalysis did not show any evidence of growth    TESTING  PVR: 0 mL    Assessment & Plan   Chronic irritative lower urinary tract symptoms  Recent acute prostatitis  Suprapubic tension/pressure  Perineal pressure  Postvoid residual    It was my pleasure to meet with Mr. Dixon in the office today in regards to his chronic urinary symptoms and recent prostatitis.  After spending some time reviewing his clinical history, I was first happy to inform him that as of right now I do not necessarily see anything dangerous going on.  We reviewed his most recent urine testing and is very reassuring postvoid residual, and ultimately noted that I believe his symptoms are potentially happening for a few different reasons.      My first thought is that he could be suffering from some amount of pelvic floor dysfunction.  Given the longstanding issues with suprapubic tightness or pressure, this chronic irritative voiding symptoms of urinary frequency, and some leakage of urine even back into his early 20s, believe this possibly could be due to a abnormality in his pelvic floor muscles potentially due to all of his cycling.  With this in mind, I would strongly recommend be referred him to our colleagues in pelvic floor physical therapy for evaluation and discussion of his symptoms to see if he can offer any interventions or management options.     We then reviewed that his picture of postvoid your blood is not particularly straightforward, and I believe a cystoscopy would be in order to more fully evaluate what is going on.  I believe it is possible that he has a urethral diverticulum, though my suspicion is low.  However, I do believe it would be best for us to complete a cystoscopy to evaluate for this possible diagnosis alongside other potential diagnoses that could be causing his symptoms.    Mr. Dixon  expressed understanding and agreement to the above discussion and plan and all of his questions were answered to his satisfaction.     PLAN  First available cystoscopy with Dr. Oconnor  Referral to pelvic floor physical therapy    Signed by:    Gustavo Goins PA-C    I spent a total of 40 minutes spent on the date of the encounter doing chart review, history and exam, documentation, and further activities as noted above.

## 2024-01-29 ENCOUNTER — OFFICE VISIT (OUTPATIENT)
Dept: UROLOGY | Facility: CLINIC | Age: 39
End: 2024-01-29
Attending: FAMILY MEDICINE
Payer: COMMERCIAL

## 2024-01-29 ENCOUNTER — PRE VISIT (OUTPATIENT)
Dept: UROLOGY | Facility: CLINIC | Age: 39
End: 2024-01-29

## 2024-01-29 VITALS
HEIGHT: 73 IN | SYSTOLIC BLOOD PRESSURE: 118 MMHG | RESPIRATION RATE: 16 BRPM | HEART RATE: 80 BPM | DIASTOLIC BLOOD PRESSURE: 71 MMHG | BODY MASS INDEX: 19.42 KG/M2 | OXYGEN SATURATION: 99 %

## 2024-01-29 DIAGNOSIS — R30.0 DYSURIA: ICD-10-CM

## 2024-01-29 DIAGNOSIS — N32.89 BLADDER SPASM: ICD-10-CM

## 2024-01-29 DIAGNOSIS — R10.2 PERINEAL PAIN IN MALE: Primary | ICD-10-CM

## 2024-01-29 DIAGNOSIS — N41.0 PROSTATITIS, ACUTE: ICD-10-CM

## 2024-01-29 PROCEDURE — 99244 OFF/OP CNSLTJ NEW/EST MOD 40: CPT | Mod: 25 | Performed by: STUDENT IN AN ORGANIZED HEALTH CARE EDUCATION/TRAINING PROGRAM

## 2024-01-29 PROCEDURE — 51798 US URINE CAPACITY MEASURE: CPT | Performed by: STUDENT IN AN ORGANIZED HEALTH CARE EDUCATION/TRAINING PROGRAM

## 2024-01-29 RX ORDER — OXYBUTYNIN CHLORIDE 5 MG/1
5 TABLET, EXTENDED RELEASE ORAL DAILY
Qty: 30 TABLET | Refills: 11 | Status: SHIPPED | OUTPATIENT
Start: 2024-01-29 | End: 2024-03-15

## 2024-01-29 ASSESSMENT — ENCOUNTER SYMPTOMS
BACK PAIN: 0
ACTIVITY CHANGE: 0
ABDOMINAL PAIN: 1
DIZZINESS: 0
UNEXPECTED WEIGHT CHANGE: 0
LIGHT-HEADEDNESS: 0
SLEEP DISTURBANCE: 0
SHORTNESS OF BREATH: 0
ADENOPATHY: 0
HEMATURIA: 0

## 2024-01-29 ASSESSMENT — PAIN SCALES - GENERAL: PAINLEVEL: NO PAIN (0)

## 2024-01-29 NOTE — LETTER
"1/29/2024       RE: Long Dixon  [address] [place] [address]     Dear Colleague,    Thank you for referring your patient, Long Dixon, to the [facility] [place] at [facility]. Please see a copy of my visit note below.    Subjective    REQUESTING PROVIDER  Dr. Ever Mahoney MD    REASON FOR VISIT  Irritative lower urinary tract symptoms    HISTORY OF PRESENT ILLNESS  Mr. Dixon is a pleasant 38 year old male with no significant past medical history who presents today for chronic bothersome lower urinary tract symptoms.  I personally reviewed the family practice visit notes from 12/13/2023, 12/27/2023, and 1/23/2024 in preparation for today's visit.    According to those notes, Long endorsed a worsening of his baseline somewhat bothersome irritative lower urinary tract symptoms alongside subjective fever, pain with urination, and perineal discomfort.  Prostate exam completed on 12/27/2023 showed a boggy enlarged prostate that was tender to palpation.  With this in mind he was put on a 4-week course of ciprofloxacin, and endorses improvement in a good number of his urinary symptoms.  However, at his visit on 1/23/2024 he noted that he had some chronic urinary symptoms he was hoping to discuss so he was referred to urology.    Today:  First symptoms starting in mid 20s - post defecation pelvic pain and post-void dribbling  Post void dribbling really only happened if he sat after a bowel movement   Worsening nocturia is what led to the diagnosis of prostatitis   Biggest complaint now is constant urge to urinate and the above mentioned post-bowel movement symptoms   Some pressure of the suprapubic area now   Lots of GI upset after the 4 week course of antibiotics   Fluids:  Coffee: 4 oz  Water: 40 - 60 oz   Occasional cocktail   Denies any regular pelvic pain  Endorses that ejaculation can cause a \"twitch\" in his right leg  No " gross hematuria     REVIEW OF SYSTEMS  Review of Systems   Constitutional:  Negative for activity change and unexpected weight change.   HENT:  Negative for ear pain and tinnitus.    Eyes:  Negative for visual disturbance.   Respiratory:  Negative for shortness of breath.    Cardiovascular:  Negative for chest pain.   Gastrointestinal:  Positive for abdominal pain (More of a bloating or pressure feeling).   Genitourinary:  Negative for hematuria.   Musculoskeletal:  Negative for back pain (Chronic in nature).   Neurological:  Negative for dizziness and light-headedness.   Hematological:  Negative for adenopathy.   Psychiatric/Behavioral:  Negative for sleep disturbance.      Social History:  Quit smoking 2 years ago, stopped vaping a few weeks ago    Family History:  Denies any known family history of urologic malignancy     Objective    PHYSICAL EXAM  Physical Exam  Constitutional:       Appearance: Normal appearance.   HENT:      Head: Normocephalic and atraumatic.      Nose: No congestion.      Mouth/Throat:      Mouth: Mucous membranes are moist.   Eyes:      General:         Right eye: No discharge.         Left eye: No discharge.   Pulmonary:      Effort: No respiratory distress.   Abdominal:      General: There is no distension.   Musculoskeletal:         General: No deformity.   Skin:     General: Skin is warm and dry.   Neurological:      Mental Status: He is alert and oriented to person, place, and time.   Psychiatric:         Mood and Affect: Mood normal.         Behavior: Behavior normal.       LABORATORY   Latest Reference Range & Units 12/19/23 06:15   Color Urine Colorless, Straw, Light Yellow, Yellow  Yellow   Appearance Urine Clear  Clear   Glucose Urine Negative mg/dL Negative   Bilirubin Urine Negative  Negative   Ketones Urine Negative mg/dL Negative   Specific Gravity Urine 1.005 - 1.030  >=1.030   pH Urine 5.0 - 8.0  6.0   Protein Albumin Urine Negative mg/dL Negative   Urobilinogen Urine 0.2,  1.0 E.U./dL 0.2   Nitrite Urine Negative  Negative   Blood Urine Negative  Negative   Leukocyte Esterase Urine Negative  Negative   WBC Urine 0-5 /HPF /HPF 0-5   RBC Urine 0-2 /HPF /HPF None Seen   Bacteria Urine None Seen /HPF None Seen   Squamous Epithelial /LPF Urine None Seen /LPF Few !   Mucus Urine None Seen /LPF Present !   Calcium Oxalate None Seen /HPF Few !   !: Data is abnormal    Urine culture associated with above urinalysis did not show any evidence of growth    TESTING  PVR: 0 mL    Assessment & Plan  Chronic irritative lower urinary tract symptoms  Recent acute prostatitis  Suprapubic tension/pressure  Perineal pressure  Postvoid residual    It was my pleasure to meet with Mr. Dixon in the office today in regards to his chronic urinary symptoms and recent prostatitis.  After spending some time reviewing his clinical history, I was first happy to inform him that as of right now I do not necessarily see anything dangerous going on.  We reviewed his most recent urine testing and is very reassuring postvoid residual, and ultimately noted that I believe his symptoms are potentially happening for a few different reasons.      My first thought is that he could be suffering from some amount of pelvic floor dysfunction.  Given the longstanding issues with suprapubic tightness or pressure, this chronic irritative voiding symptoms of urinary frequency, and some leakage of urine even back into his early 20s, believe this possibly could be due to a abnormality in his pelvic floor muscles potentially due to all of his cycling.  With this in mind, I would strongly recommend be referred him to our colleagues in pelvic floor physical therapy for evaluation and discussion of his symptoms to see if he can offer any interventions or management options.     We then reviewed that his picture of postvoid your blood is not particularly straightforward, and I believe a cystoscopy would be in order to more fully evaluate  what is going on.  I believe it is possible that he has a urethral diverticulum, though my suspicion is low.  However, I do believe it would be best for us to complete a cystoscopy to evaluate for this possible diagnosis alongside other potential diagnoses that could be causing his symptoms.    Mr. Dixon expressed understanding and agreement to the above discussion and plan and all of his questions were answered to his satisfaction.     PLAN  First available cystoscopy with Dr. Oconnor  Referral to pelvic floor physical therapy    Signed by:    Gustavo Goins PA-C    I spent a total of 40 minutes spent on the date of the encounter doing chart review, history and exam, documentation, and further activities as noted above.

## 2024-02-02 ENCOUNTER — PRE VISIT (OUTPATIENT)
Dept: UROLOGY | Facility: CLINIC | Age: 39
End: 2024-02-02
Payer: COMMERCIAL

## 2024-02-02 NOTE — TELEPHONE ENCOUNTER
Reason for visit: cystoscopy per leon aly      Dx/Hx/Sx: microscopic hematuria , LUTS    Records/imaging/labs/orders: in epic    At Rooming: standard- consent

## 2024-02-07 ENCOUNTER — OFFICE VISIT (OUTPATIENT)
Dept: UROLOGY | Facility: CLINIC | Age: 39
End: 2024-02-07
Payer: COMMERCIAL

## 2024-02-07 VITALS
DIASTOLIC BLOOD PRESSURE: 82 MMHG | HEART RATE: 60 BPM | HEIGHT: 73 IN | BODY MASS INDEX: 19.22 KG/M2 | SYSTOLIC BLOOD PRESSURE: 120 MMHG | WEIGHT: 145 LBS

## 2024-02-07 DIAGNOSIS — R39.9 LOWER URINARY TRACT SYMPTOMS (LUTS): ICD-10-CM

## 2024-02-07 DIAGNOSIS — R10.2 PERINEAL PAIN IN MALE: Primary | ICD-10-CM

## 2024-02-07 PROCEDURE — 99207 PR DROP WITH A PROCEDURE: CPT | Performed by: UROLOGY

## 2024-02-07 PROCEDURE — 52000 CYSTOURETHROSCOPY: CPT | Performed by: UROLOGY

## 2024-02-07 RX ORDER — LIDOCAINE HYDROCHLORIDE 20 MG/ML
JELLY TOPICAL ONCE
Status: COMPLETED | OUTPATIENT
Start: 2024-02-07 | End: 2024-02-07

## 2024-02-07 RX ADMIN — LIDOCAINE HYDROCHLORIDE: 20 JELLY TOPICAL at 11:21

## 2024-02-07 ASSESSMENT — PAIN SCALES - GENERAL: PAINLEVEL: NO PAIN (0)

## 2024-02-07 NOTE — NURSING NOTE
"Chief Complaint   Patient presents with    Cystoscopy       Blood pressure 120/82, pulse 60, height 1.854 m (6' 1\"), weight 65.8 kg (145 lb). Body mass index is 19.13 kg/m .    There is no problem list on file for this patient.      No Known Allergies    Current Outpatient Medications   Medication Sig Dispense Refill    oxyBUTYnin ER (DITROPAN XL) 5 MG 24 hr tablet Take 1 tablet (5 mg) by mouth daily 30 tablet 11    bismuth subsalicylate (PEPTO BISMOL) 262 MG/15ML suspension Take 15 mLs by mouth every 6 hours as needed for indigestion 473 mL 0    calcium carbonate (TUMS) 500 MG chewable tablet Take 2 tablets (1,000 mg) by mouth 2 times daily 60 tablet 0    ciprofloxacin (CIPRO) 500 MG tablet Take 1 tablet (500 mg) by mouth 2 times daily 56 tablet 0    famotidine (PEPCID) 20 MG tablet Take 1 tablet (20 mg) by mouth 2 times daily as needed (As needed for abdominal symptoms) 30 tablet 0    tamsulosin (FLOMAX) 0.4 MG capsule Take 1 capsule (0.4 mg) by mouth daily (Patient not taking: Reported on 2024) 30 capsule 0       Social History     Tobacco Use    Smoking status: Former     Packs/day: 1     Types: Cigarettes     Quit date: 2021     Years since quitting: 3.1     Passive exposure: Never    Smokeless tobacco: Never   Vaping Use    Vaping Use: Former    Start date: 2021    Substances: Nicotine, THC, CBD, Flavoring    Devices: Disposable       Invasive Procedure Safety Checklist:    Procedure: Cystoscopy    Action: Complete sections and checkboxes as appropriate.    Pre-procedure:  1. Patient ID Verified with 2 identifiers ([facility] and  or MRN) : YES    2. Procedure and site verified with patient/designee (when able) : YES    3. Accurate consent documentation in medical record : YES    4. H&P (or appropriate assessment) documented in medical record : N/A  H&P must be up to 30 days prior to procedure an updated within 24 hours of                 Procedure as applicable.     5. Relevant diagnostic and " radiology test results appropriately labeled and displayed as applicable : YES    6. Blood products, implants, devices, and/or special equipment available for the procedure as applicable : YES    7. Procedure site(s) marked with provider initials [Exclusions: none] : NO    8. Marking not required. Reason : Yes  Procedure does not require site marking    Time Out:     Time-Out performed immediately prior to starting procedure, including verbal and active participation of all team members addressing: YES    1. Correct patient identity.  2. Confirmed that the correct side and site are marked.  3. An accurate procedure to be done.  4. Agreement on the procedure to be done.  5. Correct patient position.  6. Relevant images and results are properly labeled and appropriately displayed.  7. The need to administer antibiotics or fluids for irrigation purposes during the procedure as applicable.  8. Safety precautions based on patient history or medication use.    During Procedure: Verification of correct person, site, and procedure occurs any time the responsibility for care of the patient is transferred to another member of the care team.    The following medication was given:     MEDICATION:  Lidocaine without epinephrine 2% jelly  ROUTE: urethral   SITE: urethral   DOSE: 10 mL  LOT #: [id number]  : [employer]  EXPIRATION DATE: 8-25  NDC#: [address]-3   Was there drug waste? No    Prior to med admin, verified patient identity using patient's name and date of birth.  Due to med administration, patient instructed to remain in clinic for 15 minutes  afterwards, and to report any adverse reaction to me immediately.    Drug Amount Wasted:  None.  Vial/Syringe: Syringe      Jeanette Aydin  2/7/2024  10:41 AM

## 2024-02-07 NOTE — PATIENT INSTRUCTIONS
"Thank you for visiting with Dr. Oconnor today.  The following has been recommended for you based on the results of your appointment:     -Follow-up as needed.    Please reach out to us with any questions!  [phone]    Thank you,  Carey Brown LPN    AFTER YOUR CYSTOSCOPY        You have just completed a cystoscopy, or \"cysto\", which allowed your physician to learn more about your bladder (or to remove a stent placed after surgery). We suggest that you continue to avoid caffeine, fruit juice, and alcohol for the next 24 hours, however, you are encouraged to return to your normal activities.         A few things that are considered normal after your cystoscopy:     * Small amount of bleeding (or spotting) that clears within the next 24 hours     * Slight burning sensation with urination     * Sensation to of needing to avoid more frequently     * The feeling of \"air\" in your urine     * Mild discomfort that is relieved with Tylenol        Please contact our office promptly if you:     * Develop a fever above 101 degrees     * Are unable to urinate     * Develop bright red blood that does not stop     * Severe pain or swelling         Please contact our office with any concerns or questions @[email or web address].  "

## 2024-02-07 NOTE — LETTER
2/7/2024       RE: Long Dixon  [address] [place] [address]     Dear Colleague,    Thank you for referring your patient, Long Dixon, to the [facility]OLOG[facility] [place] at [facility]. Please see a copy of my visit note below.    Cystoscopy Note    PRE-PROCEDURE DIAGNOSIS:  LUTS    POST-PROCEDURE DIAGNOSIS:  LUTS  PROCEDURE:   Cystoscopy    HISTORY: Long Dixon is a 38 year old male with various LUTS and pelvic floor complaints.  We are considering pelvic floor PT, but he was recommended to have a cystoscopy to rule out anatomic cause first.    DESCRIPTION OF PROCEDURE:  After informed consent was obtained, the patient was brought to the procedure room where they were placed in the modified lithotomy position with all pressure points well padded.  The patient was prepped and draped in a sterile fashion. A flexible cystoscope was introduced through a well-lubricated urethra. There were no urethral strictures. The bladder was entered. The urine was clear. Systematic cystoscopy was performed. There were no tumors, stones, or other abnormalities in the bladder. The sphincter was notably tight during cystoscopy. Scope was removed.    ASSESSMENT AND PLAN:  I suspect pelvic floor dysfunction due to negative cystoscopy except for a tight urethral sphincter.    Recommend pelvic floor PT and followup with [facility].    Charles Oconnor MD  Reconstructive Urology

## 2024-02-07 NOTE — NURSING NOTE
"Chief Complaint   Patient presents with    Cystoscopy       Blood pressure 120/82, pulse 60, height 1.854 m (6' 1\"), weight 65.8 kg (145 lb). Body mass index is 19.13 kg/m .    There is no problem list on file for this patient.      No Known Allergies    Current Outpatient Medications   Medication Sig Dispense Refill    oxyBUTYnin ER (DITROPAN XL) 5 MG 24 hr tablet Take 1 tablet (5 mg) by mouth daily 30 tablet 11    bismuth subsalicylate (PEPTO BISMOL) 262 MG/15ML suspension Take 15 mLs by mouth every 6 hours as needed for indigestion 473 mL 0    calcium carbonate (TUMS) 500 MG chewable tablet Take 2 tablets (1,000 mg) by mouth 2 times daily 60 tablet 0    ciprofloxacin (CIPRO) 500 MG tablet Take 1 tablet (500 mg) by mouth 2 times daily 56 tablet 0    famotidine (PEPCID) 20 MG tablet Take 1 tablet (20 mg) by mouth 2 times daily as needed (As needed for abdominal symptoms) 30 tablet 0    tamsulosin (FLOMAX) 0.4 MG capsule Take 1 capsule (0.4 mg) by mouth daily (Patient not taking: Reported on 2/7/2024) 30 capsule 0       Social History     Tobacco Use    Smoking status: Former     Packs/day: 1     Types: Cigarettes     Quit date: 1/1/2021     Years since quitting: 3.1     Passive exposure: Never    Smokeless tobacco: Never   Vaping Use    Vaping Use: Former    Start date: 1/1/2021    Substances: Nicotine, THC, CBD, Flavoring    Devices: Disposable       Jeanette Perrin  2/7/2024  10:41 AM     "

## 2024-02-07 NOTE — PROGRESS NOTES
Cystoscopy Note    PRE-PROCEDURE DIAGNOSIS:  LUTS    POST-PROCEDURE DIAGNOSIS:  LUTS  PROCEDURE:   Cystoscopy    HISTORY: Long Dixon is a 38 year old male with various LUTS and pelvic floor complaints.  We are considering pelvic floor PT, but he was recommended to have a cystoscopy to rule out anatomic cause first.    DESCRIPTION OF PROCEDURE:  After informed consent was obtained, the patient was brought to the procedure room where they were placed in the modified lithotomy position with all pressure points well padded.  The patient was prepped and draped in a sterile fashion. A flexible cystoscope was introduced through a well-lubricated urethra. There were no urethral strictures. The bladder was entered. The urine was clear. Systematic cystoscopy was performed. There were no tumors, stones, or other abnormalities in the bladder. The sphincter was notably tight during cystoscopy. Scope was removed.    ASSESSMENT AND PLAN:  I suspect pelvic floor dysfunction due to negative cystoscopy except for a tight urethral sphincter.    Recommend pelvic floor PT and followup with [facility].    Charles Oconnor MD  Reconstructive Urology

## 2024-02-15 ENCOUNTER — THERAPY VISIT (OUTPATIENT)
Dept: PHYSICAL THERAPY | Facility: CLINIC | Age: 39
End: 2024-02-15
Attending: STUDENT IN AN ORGANIZED HEALTH CARE EDUCATION/TRAINING PROGRAM
Payer: COMMERCIAL

## 2024-02-15 DIAGNOSIS — R10.2 PERINEAL PAIN IN MALE: ICD-10-CM

## 2024-02-15 DIAGNOSIS — N81.89 PELVIC FLOOR WEAKNESS: Primary | ICD-10-CM

## 2024-02-15 PROCEDURE — 97535 SELF CARE MNGMENT TRAINING: CPT | Mod: GP

## 2024-02-15 PROCEDURE — 97161 PT EVAL LOW COMPLEX 20 MIN: CPT | Mod: GP

## 2024-02-15 PROCEDURE — 97530 THERAPEUTIC ACTIVITIES: CPT | Mod: GP

## 2024-02-15 PROCEDURE — 97110 THERAPEUTIC EXERCISES: CPT | Mod: GP

## 2024-02-15 NOTE — PROGRESS NOTES
"PHYSICAL THERAPY EVALUATION  Type of Visit: Evaluation    See electronic medical record for Abuse and Falls Screening details.    Subjective       Presenting condition or subjective complaint: diagnosed w/ pelvic floor dysfunction. constant feeling like i need to urinate, leaking, etc starting in early dec though he had a UTI and antibiotics didn't do anything. Thought they had prostatitis and went on antibiotics for that which improved sx but not completely gone.  Had pelvic pressure and felt like he was leaking which has improved. Feels like he has to go to the bathroom constantly. After BMs feels like he needs to urinate.  Date of onset: 12/01/23    Relevant medical history: Arthritis; Bladder or bowel problems; Incontinence; Migraines or headaches   Dates & types of surgery:      Prior diagnostic imaging/testing results: Other ultrasound, systoscopy, blood/urine/stool tests, to eliminate larger potential issues  PVR: 0 mL   Prior therapy history for the same diagnosis, illness or injury: No      Prior Level of Function  Transfers: Independent  Ambulation: Independent  ADL: Independent  IADL: independent     Living Environment   Social support: With a significant other or spouse   Type of home: House   Stairs to enter the home: Yes 15 Is there a railing: Yes   Ramp: No   Stairs inside the home: Yes 13 Is there a railing: Yes   Help at home: None  Equipment owned:       Employment: Yes  - will stand at least 2 hours/day   Hobbies/Interests: Bicycling, video games, hiking, reading, record collecting    Has starting doing a few stretching exercises for pelvic floor   Walking 2x/day    Patient goals for therapy: stay \"dry\" not constantly think about peeing, sit down without leaking a drop or two, etc.    Pain assessment: Pain denied     Objective      PELVIC EVALUATION  ADDITIONAL HISTORY:  Sex assigned at birth: Male  Gender identity: Male    Pronouns: He/Him/His      Bladder History:  Feels bladder " filling: No  Triggers for feeling of inability to wait to go to the bathroom: No    How long can you wait to urinate: no regularity, can generally hold it as long as needed, have never had a total loss of control.  Gets up at night to urinate: Yes 1-2  Can stop the flow of urine when urinating: Sometimes  Volume of urine usually released: Medium   Other issues: Trouble emptying bladder completely; Dribbling after urinating  Number of bladder infections in last 12 months:    Fluid intake per day: 32-64oz water 6-12oz coffee 0-24oz alcohol  Medications taken for bladder: Yes oxybutinin - unsure if helpful, been taking a few weeks   Activities causing urine leak: Other activities bending over, sitting down, adjusting position in chair, etc  Amount of urine typically leaked: single drops at a time. never enough to show through pants, just enough to be uncomfortable.   Pads used to help with leaking: Yes I use this many pads per day: currently zero, was 1-2 when issues started. I use this type/brand: depend (lightest option)    Feels like he still has urine in his penis   Heaviness in low abdomen   With urination has pressure in back side   Occasional twitch from inner thigh down leg with urination     Bowel History:  Frequency of bowel movement: daily 1-2  Consistency of stool: Other seems solid, and fairly normal, not sure what that would be considered - type 3- 4 on [facility] stool chart   Ignores the urge to defecate: Sometimes  Other bowel issues: Loss of gas; Straining to have bowel movement sometimes lots of wiping, no loss of gas instead feels like he can't pass gas, can't tell difference between needing to pass gas and stool   Length of time spent trying to have a bowel movement: i periodically will feel like i need to go right after going, or a strong urge to go, and then i sit and strain and nothing. leaking urine is worse immediately following bowel movement.  No fiber, magnesium, or miralax    Sexual Function  "History:  Sexual orientation: Straight    Sexually active: Yes  Lubrication used: No No  Pelvic pain:      Pain or difficulty with orgasms/erection/ejaculation: Yes have never been particularily \"long lasting\" recently occasional issues with getting or maintaining erection.  No pain   State of menopause:    Hormone medications: No      Are you currently pregnant: No, Have you been diagnosed with pelvic prolapse or abdominal separation: No, Do you get regular exercise: Yes, I do this type of exercise: walking/hiking, biking, kayaking, Have you tried pelvic floor strengthening exercises for 4 weeks: No, Do you have any history of trauma that is relevant to your care that you d like to share: No    Discussed reason for referral regarding pelvic health needs and external/internal pelvic floor muscle examination with patient/guardian.  Opportunity provided to ask questions and verbal consent for assessment and intervention was given.    PAIN: Pain Level at Rest: 0/10  Pain Level with Use: 4/10  Pain Location: lumbar spine and and pain down R leg   Pain Quality: Aching, Dull, and Pressure  Pain Frequency: intermittent  Pain is Worst: daytime  Pain is Exacerbated By: biking, being active   Pain is Relieved By: rest and marijuana   LUMBAR SCREEN: AROM WFL  HIP SCREEN:  Strength: WFL  R leg slightly weaker than L    ROM: WFL, slight decrease IR B, tighter R leg        PELVIC EXAM  External Visual Inspection:  At rest: Normal  With voluntary pelvic floor contraction: Perineal elevation  Relaxation of PFM: Yes  With intra-abdominal pressure: Bearing down as defecation: minimal descent      External Digital Palpation per Perineum:   Unremarkable       BIOFEEDBACK:  Position: Supine, Sit  Surface Electrodes: Perineal      Perianals:   Around 3mV baseline supine, lower in sitting, mildly slow recruitment and derecruitment         Assessment & Plan   CLINICAL IMPRESSIONS  Medical Diagnosis: perineal pain    Treatment Diagnosis: " pelvic floor weakness   Impression/Assessment: Patient is a 38 year old male with urinary urgency, constipation complaints.  The following significant findings have been identified: Pain, Decreased strength, Impaired muscle performance, and Decreased activity tolerance. These impairments interfere with their ability to perform self care tasks, work tasks, and recreational activities as compared to previous level of function.     Clinical Decision Making (Complexity):  Clinical Presentation: Stable/Uncomplicated  Clinical Presentation Rationale: based on medical and personal factors listed in PT evaluation  Clinical Decision Making (Complexity): Low complexity    PLAN OF CARE  Treatment Interventions:  Modalities: Biofeedback, Dry Needling, Ultrasound  Interventions: Manual Therapy, Neuromuscular Re-education, Therapeutic Activity, Therapeutic Exercise, Self-Care/Home Management    Long Term Goals     PT Goal 1  Goal Identifier: self care  Goal Description: patient will verbalize/demonstrate pro-active self-care measures to improve pelvic floor function including bladder/bowel hygiene; activities to reduce stress and anxiety; and prescribed exercises for pelvic floor function with the assistance of educational materials from physical therapy sessions  Rationale: to maximize safety and independence within the home  Target Date: 03/28/24  PT Goal 2  Goal Identifier: urinary urgency  Goal Description: pt will improve pelvic floor strength and coordination to reduce urinary urgency to <1x/week  Rationale: to maximize safety and independence with performance of ADLs and functional tasks  Target Date: 05/09/24  PT Goal 3  Goal Identifier: bowel movements  Goal Description: pt will have bowel movements 1-2x/day that are soft formed without straining  Rationale: to maximize safety and independence with performance of ADLs and functional tasks  Target Date: 05/09/24      Frequency of Treatment: 1x/week for 4 weeks then 1x  every 2 weeks for 8 weeks  Duration of Treatment: 12 weeks    Recommended Referrals to Other Professionals: Physical Therapy  Education Assessment:   Learner/Method: Patient    Risks and benefits of evaluation/treatment have been explained.   Patient/Family/caregiver agrees with Plan of Care.     Evaluation Time:     PT Eval, Low Complexity Minutes (26572): 35       Signing Clinician: Tamika Yan PT

## 2024-02-22 ENCOUNTER — THERAPY VISIT (OUTPATIENT)
Dept: PHYSICAL THERAPY | Facility: CLINIC | Age: 39
End: 2024-02-22
Attending: STUDENT IN AN ORGANIZED HEALTH CARE EDUCATION/TRAINING PROGRAM
Payer: COMMERCIAL

## 2024-02-22 DIAGNOSIS — R10.2 PERINEAL PAIN IN MALE: Primary | ICD-10-CM

## 2024-02-22 DIAGNOSIS — N81.89 PELVIC FLOOR WEAKNESS: ICD-10-CM

## 2024-02-22 PROCEDURE — 97140 MANUAL THERAPY 1/> REGIONS: CPT | Mod: GP

## 2024-02-22 PROCEDURE — 97530 THERAPEUTIC ACTIVITIES: CPT | Mod: GP

## 2024-02-22 PROCEDURE — 97110 THERAPEUTIC EXERCISES: CPT | Mod: GP

## 2024-02-28 ENCOUNTER — MYC MEDICAL ADVICE (OUTPATIENT)
Dept: UROLOGY | Facility: CLINIC | Age: 39
End: 2024-02-28
Payer: COMMERCIAL

## 2024-02-29 ENCOUNTER — THERAPY VISIT (OUTPATIENT)
Dept: PHYSICAL THERAPY | Facility: CLINIC | Age: 39
End: 2024-02-29
Payer: COMMERCIAL

## 2024-02-29 DIAGNOSIS — N81.89 PELVIC FLOOR WEAKNESS: ICD-10-CM

## 2024-02-29 DIAGNOSIS — R10.2 PERINEAL PAIN IN MALE: Primary | ICD-10-CM

## 2024-02-29 PROCEDURE — 97112 NEUROMUSCULAR REEDUCATION: CPT | Mod: GP

## 2024-02-29 PROCEDURE — 97530 THERAPEUTIC ACTIVITIES: CPT | Mod: GP

## 2024-02-29 PROCEDURE — 97110 THERAPEUTIC EXERCISES: CPT | Mod: GP

## 2024-03-12 NOTE — PROGRESS NOTES
"Visit conducted via real-time audio/video technology by Gustavo Goins PA-C to the patient in their home. Patient consented to this billed visit that was started at 8:34 AM and completed at 8:59 AM.    REASON FOR VISIT  Pelvic pain and irritative lower urinary tract symptoms follow-up    HISTORY OF PRESENT ILLNESS  Mr. Dixon is a 39 year old male who I am speaking with today in regards to his longstanding lower urinary tract symptoms as well as pelvic discomfort.  I first met him on 1/29/2024, at which time we discussed my main concern was for pelvic floor dysfunction, but given some amount of suspicion for stricture disease, recommended a first available cystoscopy.  He completed this with Dr. Oconnor on 2/7/2024, who found the only significant finding to be a tight urinary sphincter and also recommended pelvic floor physical therapy.    Long sent us a [employer] message on 2/28/2024 endorsing continued GI issues since being on the extended course of antibiotics for presumed prostatitis.  He also is having some significant bowel dysfunction because of this and felt that the oxybutynin was not working well for him.  Ultimately we discussed and decided on stopping the oxybutynin to eliminate a variable and I encouraged him to continue working with his primary care on his GI issues.    Today:  I received a message from his physical therapist prior to today's visit stating the following:  \"I have been seeing the attached pt for pelvic PT for urinary and bowel issues. You see him tomorrow, so I wanted to give you an update. He has been seeing improvement in symptoms, but he hasn't had complete resolution yet. He has lingering urinary urges after bowel movements and after he urinates, feels like he still has to go for 20-30 minutes which then subsides. He has significantly reduced his urinary frequency from 12+ times/day to ~6x/day. He also is still having occasional urinary leakage which he describes as just 1-2 " "drops but has been having less frequently. He is still having some lingering bowel issues which he is scheduled to follow up with his PCP on 3/20. Let me know if you have any questions. \"  Morning abdominal cramps   One of the larger issues still is post-void dribbling - leads to a wetness under his foreskin that becomes uncomfortable   Notes that the penile frenulum makes it harder to retract during erections - also notes this makes it harder to get the last drop of urine out    PHYSICAL EXAMINATION  Deferred given virtual visit.    TESTING  Cystoscopy completed by Dr. Oconnor on 2/7/24:    DESCRIPTION OF PROCEDURE:  After informed consent was obtained, the patient was brought to the procedure room where they were placed in the modified lithotomy position with all pressure points well padded.  The patient was prepped and draped in a sterile fashion. A flexible cystoscope was introduced through a well-lubricated urethra. There were no urethral strictures. The bladder was entered. The urine was clear. Systematic cystoscopy was performed. There were no tumors, stones, or other abnormalities in the bladder. The sphincter was notably tight during cystoscopy. Scope was removed.     ASSESSMENT AND PLAN:  I suspect pelvic floor dysfunction due to negative cystoscopy except for a tight urethral sphincter.     Recommend pelvic floor PT and followup with [facility].    IMPRESSION  Pelvic floor pain/dysfunction  Frenulum breve   Post-void dribbling     It was my pleasure to speak with Long in follow-up for his pelvic floor dysfunction as well as his more immediate concerns of a penile frenulum that may be linked to his bothersome postvoid dribbling.  In regards to his overall pelvic floor dysfunction, I am glad to hear that he is seeing such good improvements with the pelvic floor physical therapy, and I would encourage him to continue doing these exercises, stretches, and overall interventions.    In regards to his " concerns for the single drop of urine that does not seem to leave that may or may not be linked to his penile frenulum, we discussed different interventions, and I began specifically I talked about surgical options.  However, based on some research he had done on his own, he questioned whether or not a trial of a steroid cream with stretching exercises could be of benefit to him which I agree could possibly give him some benefit.  With this in mind I sent a prescription for clobetasol to his pharmacy which she can try if he would like to.  Would recommend that he use this medication twice daily for up to 4 weeks but start with 2 weeks.  He should retract the foreskin until it is uncomfortable, then apply the clobetasol, stretch the frenulum, then replace the foreskin over the head of the penis.    In regards to follow-up, given that the clobetasol is not a medication that should need to be renewed, overall I believe he is free to follow-up with us as needed versus any dedicated follow-up if he is comfortable with this.  At this time he does express comfort ability with staying in contact over MyChart but deferring a further scheduled appointment. Mr. Dixon expressed understanding and agreement to the above discussion and plan and all of his questions were answered to his satisfaction.      PLAN  Script sent for clobetasol for tight frenulum  Continue following with pelvic or physical therapy  Follow-up with urology as needed    SIGNED    Gustavo Goins PA-C      I spent a total of 30 minutes spent on the date of the encounter doing chart review, history and exam, documentation, and further activities as noted above.

## 2024-03-14 ENCOUNTER — THERAPY VISIT (OUTPATIENT)
Dept: PHYSICAL THERAPY | Facility: CLINIC | Age: 39
End: 2024-03-14
Payer: COMMERCIAL

## 2024-03-14 DIAGNOSIS — N81.89 PELVIC FLOOR WEAKNESS: ICD-10-CM

## 2024-03-14 DIAGNOSIS — R10.2 PERINEAL PAIN IN MALE: Primary | ICD-10-CM

## 2024-03-14 PROCEDURE — 97110 THERAPEUTIC EXERCISES: CPT | Mod: GP

## 2024-03-14 PROCEDURE — 97530 THERAPEUTIC ACTIVITIES: CPT | Mod: GP

## 2024-03-15 ENCOUNTER — VIRTUAL VISIT (OUTPATIENT)
Dept: UROLOGY | Facility: CLINIC | Age: 39
End: 2024-03-15
Payer: COMMERCIAL

## 2024-03-15 DIAGNOSIS — M62.89 PELVIC FLOOR DYSFUNCTION: ICD-10-CM

## 2024-03-15 DIAGNOSIS — Q55.69 PERSISTENT FRENULUM OF PENIS: Primary | ICD-10-CM

## 2024-03-15 PROCEDURE — 99214 OFFICE O/P EST MOD 30 MIN: CPT | Mod: 95 | Performed by: STUDENT IN AN ORGANIZED HEALTH CARE EDUCATION/TRAINING PROGRAM

## 2024-03-15 RX ORDER — CLOBETASOL PROPIONATE 0.5 MG/G
OINTMENT TOPICAL 2 TIMES DAILY
Qty: 30 G | Refills: 0 | Status: SHIPPED | OUTPATIENT
Start: 2024-03-15

## 2024-03-15 ASSESSMENT — PAIN SCALES - GENERAL: PAINLEVEL: NO PAIN (0)

## 2024-03-15 NOTE — LETTER
"3/15/2024       RE: Long Dixon  [address]  [place] [address]     Dear Colleague,    Thank you for referring your patient, Long Dixon, to the [facility] [place] at . Please see a copy of my visit note below.    Visit conducted via real-time audio/video technology by Gustavo Goins PA-C to the patient in their home. Patient consented to this billed visit that was started at 8:34 AM and completed at 8:59 AM.    REASON FOR VISIT  Pelvic pain and irritative lower urinary tract symptoms follow-up    HISTORY OF PRESENT ILLNESS  Mr. Dixon is a 39 year old male who I am speaking with today in regards to his longstanding lower urinary tract symptoms as well as pelvic discomfort.  I first met him on 1/29/2024, at which time we discussed my main concern was for pelvic floor dysfunction, but given some amount of suspicion for stricture disease, recommended a first available cystoscopy.  He completed this with Dr. Oconnor on 2/7/2024, who found the only significant finding to be a tight urinary sphincter and also recommended pelvic floor physical therapy.    Long sent us a [employer] message on 2/28/2024 endorsing continued GI issues since being on the extended course of antibiotics for presumed prostatitis.  He also is having some significant bowel dysfunction because of this and felt that the oxybutynin was not working well for him.  Ultimately we discussed and decided on stopping the oxybutynin to eliminate a variable and I encouraged him to continue working with his primary care on his GI issues.    Today:  I received a message from his physical therapist prior to today's visit stating the following:  \"I have been seeing the attached pt for pelvic PT for urinary and bowel issues. You see him tomorrow, so I wanted to give you an update. He has been seeing improvement in symptoms, but he hasn't had complete resolution yet. " "He has lingering urinary urges after bowel movements and after he urinates, feels like he still has to go for 20-30 minutes which then subsides. He has significantly reduced his urinary frequency from 12+ times/day to ~6x/day. He also is still having occasional urinary leakage which he describes as just 1-2 drops but has been having less frequently. He is still having some lingering bowel issues which he is scheduled to follow up with his PCP on 3/20. Let me know if you have any questions. \"  Morning abdominal cramps   One of the larger issues still is post-void dribbling - leads to a wetness under his foreskin that becomes uncomfortable   Notes that the penile frenulum makes it harder to retract during erections - also notes this makes it harder to get the last drop of urine out    PHYSICAL EXAMINATION  Deferred given virtual visit.    TESTING  Cystoscopy completed by Dr. Oconnor on 2/7/24:    DESCRIPTION OF PROCEDURE:  After informed consent was obtained, the patient was brought to the procedure room where they were placed in the modified lithotomy position with all pressure points well padded.  The patient was prepped and draped in a sterile fashion. A flexible cystoscope was introduced through a well-lubricated urethra. There were no urethral strictures. The bladder was entered. The urine was clear. Systematic cystoscopy was performed. There were no tumors, stones, or other abnormalities in the bladder. The sphincter was notably tight during cystoscopy. Scope was removed.     ASSESSMENT AND PLAN:  I suspect pelvic floor dysfunction due to negative cystoscopy except for a tight urethral sphincter.     Recommend pelvic floor PT and followup with [facility].    IMPRESSION  Pelvic floor pain/dysfunction  Frenulum breve   Post-void dribbling     It was my pleasure to speak with Long in follow-up for his pelvic floor dysfunction as well as his more immediate concerns of a penile frenulum that may be linked to " his bothersome postvoid dribbling.  In regards to his overall pelvic floor dysfunction, I am glad to hear that he is seeing such good improvements with the pelvic floor physical therapy, and I would encourage him to continue doing these exercises, stretches, and overall interventions.    In regards to his concerns for the single drop of urine that does not seem to leave that may or may not be linked to his penile frenulum, we discussed different interventions, and I began specifically I talked about surgical options.  However, based on some research he had done on his own, he questioned whether or not a trial of a steroid cream with stretching exercises could be of benefit to him which I agree could possibly give him some benefit.  With this in mind I sent a prescription for clobetasol to his pharmacy which she can try if he would like to.  Would recommend that he use this medication twice daily for up to 4 weeks but start with 2 weeks.  He should retract the foreskin until it is uncomfortable, then apply the clobetasol, stretch the frenulum, then replace the foreskin over the head of the penis.    In regards to follow-up, given that the clobetasol is not a medication that should need to be renewed, overall I believe he is free to follow-up with us as needed versus any dedicated follow-up if he is comfortable with this.  At this time he does express comfort ability with staying in contact over MyChart but deferring a further scheduled appointment. Mr. Dixon expressed understanding and agreement to the above discussion and plan and all of his questions were answered to his satisfaction.      PLAN  Script sent for clobetasol for tight frenulum  Continue following with pelvic or physical therapy  Follow-up with urology as needed    SIGNED    Gustavo Goins PA-C      I spent a total of 30 minutes spent on the date of the encounter doing chart review, history and exam, documentation, and further activities as noted  above.    Virtual Visit Details    Type of service:  Video Visit     Originating Location (pt. Location): Home    Distant Location (provider location):  Off-site  Platform used for Video Visit: Anisa

## 2024-03-15 NOTE — NURSING NOTE
Is the patient currently in the state of [place]? YES    Visit mode:VIDEO    If the visit is dropped, the patient can be reconnected by: VIDEO VISIT: Send to e-mail at: [email or web address]    Will anyone else be joining the visit? NO  (If patient encounters technical issues they should call [phone]    How would you like to obtain your AVS? MyChart    Are changes needed to the allergy or medication list? No    Reason for visit: Follow Up    Liz GRANADO

## 2024-03-15 NOTE — PROGRESS NOTES
Virtual Visit Details    Type of service:  Video Visit     Originating Location (pt. Location): Home    Distant Location (provider location):  Off-site  Platform used for Video Visit: Anisa

## 2024-03-20 ENCOUNTER — OFFICE VISIT (OUTPATIENT)
Dept: FAMILY MEDICINE | Facility: CLINIC | Age: 39
End: 2024-03-20
Payer: COMMERCIAL

## 2024-03-20 VITALS
HEART RATE: 73 BPM | RESPIRATION RATE: 18 BRPM | WEIGHT: 149 LBS | BODY MASS INDEX: 19.75 KG/M2 | TEMPERATURE: 98 F | SYSTOLIC BLOOD PRESSURE: 113 MMHG | HEIGHT: 73 IN | DIASTOLIC BLOOD PRESSURE: 79 MMHG | OXYGEN SATURATION: 100 %

## 2024-03-20 DIAGNOSIS — R10.31 RLQ ABDOMINAL PAIN: Primary | ICD-10-CM

## 2024-03-20 DIAGNOSIS — R19.7 DIARRHEA, UNSPECIFIED TYPE: ICD-10-CM

## 2024-03-20 DIAGNOSIS — K21.00 GASTROESOPHAGEAL REFLUX DISEASE WITH ESOPHAGITIS WITHOUT HEMORRHAGE: ICD-10-CM

## 2024-03-20 PROCEDURE — 99214 OFFICE O/P EST MOD 30 MIN: CPT | Mod: GC

## 2024-03-20 RX ORDER — FAMOTIDINE 40 MG/1
40 TABLET, FILM COATED ORAL
Qty: 30 TABLET | Refills: 0 | Status: SHIPPED | OUTPATIENT
Start: 2024-03-20

## 2024-03-20 NOTE — PROGRESS NOTES
Assessment & Plan     Diarrhea, unspecified type  RLQ abdominal pain  Intermittent diarrhea and RLQ abdominal pain X 1.5 months, differential includes infectious colitis including C. difficile given recent course of ciprofloxacin, IBD, IBS, less likely acute abdomen given time frame of symptoms although this is possible, less likely urinary etiology given improvement in urinary symptoms following pelvic floor PT, less likely partial obstruction given ability to pass flatus and stool.  Will start workup to rule out infectious etiology and C. difficile, have patient complete CT abdomen pelvis for further characterization. Instructed patient to be re-evaluated either in clinic or the ED should he develop fevers or worsening symptoms.  - Enteric Bacteria and Virus Panel by [facility] Stool; Future  - C. difficile Toxin B PCR with reflex to C. difficile Antigen and Toxins A/B EIA; Future  - CT ABDOMEN PELVIS W CONTRAST; Future  - C. difficile Toxin B PCR with reflex to C. difficile Antigen and Toxins A/B EIA  - Enteric Bacteria and Virus Panel by [facility] Stool    Gastroesophageal reflux disease with esophagitis without hemorrhage  Recent severe episodes occurred in the evenings not relieved with Tums, which is a new symptom for the patient.  Will eval for H. pylori and trial famotidine on as-needed basis.  - famotidine (PEPCID) 40 MG tablet; Take 1 tablet (40 mg) by mouth nightly as needed for heartburn  - Helicobacter pylori Antigen Stool        Return in about 4 weeks (around 4/17/2024) for Follow up, with me, diarrhea/abd pain recheck.    Subjective   Long is a 39 year old, presenting for the following health issues:  Medication Reaction (Developing GI issue when taking med, stomach ache, go to bathroom, intense heart burn at night sometimes) and Follow Up (Prostatitis )        1/23/2024     8:10 AM   Additional Questions   Roomed by radha   Accompanied by self     HPI   Patient is a 37yo M presenting to clinic for diarrhea  "and abdominal pain. He was recently treated for suspected bacterial prostatitis with ciprofloxacin for 4 weeks. Since that time, he has been experiencing varying amounts of BM through the day, with approximately 0-2 episodes of diarrhea daily. Usually having at least 1 episode of diarrhea daily, small amount of quantity but is followed by a \"large amount of gas\". He has urgency to have a BM but sometimes will just sit there and strain and nothing will come out. No fevers or chills. No N/V/D.  He feels that his muscles in his pelvic floor are \"exhausted\" after a BM.  He experiences a very mild generalized abdominal pain throughout the day every day, decreased appetite, and endorses having woken up a few times at night with a burning chest pain.    Feels that his urinary symptoms have slowly improved with pelvic floor PT but not completely resolved.  He is following with urology for this.      Review of Systems  Constitutional, HEENT, cardiovascular, pulmonary, gi and gu systems are negative, except as otherwise noted.      Objective    /79 (BP Location: Right arm, Patient Position: Sitting, Cuff Size: Adult Regular)   Pulse 73   Temp 98  F (36.7  C) (Oral)   Resp 18   Ht 1.854 m (6' 1\")   Wt 67.6 kg (149 lb)   SpO2 100%   BMI 19.66 kg/m    Body mass index is 19.66 kg/m .  Physical Exam   GENERAL: alert and no distress  NECK: no adenopathy, no asymmetry, masses, or scars  RESP: lungs clear to auscultation - no rales, rhonchi or wheezes  CV: regular rate and rhythm, normal S1 S2, no S3 or S4, no murmur, click or rub, no peripheral edema  ABDOMEN: soft, no hepatosplenomegaly, no masses and bowel sounds normal. No guarding or rigidity. RLQ tenderness to palpation.  MS: no gross musculoskeletal defects noted, no edema        Signed Electronically by: Tyler Johnson,   "

## 2024-03-20 NOTE — PATIENT INSTRUCTIONS
Thank you for visiting our clinic today. As we discussed;    1)  a pill version of a probiotic from the pharmacy  2) I have prescribed a medication for heart burn to be used as needed  3) We will check for any bacteria or viruses in your stool that could be causing your symptoms  4) Try cutting things out of your diet to see if we can find a dietary culprit for your symptoms    Please feel free to call the clinic with any questions or concerns at [phone], or send a Berg message and we will get back to you as soon as we can.

## 2024-04-02 LAB

## 2024-04-02 PROCEDURE — 87338 HPYLORI STOOL AG IA: CPT

## 2024-04-02 PROCEDURE — 87493 C DIFF AMPLIFIED PROBE: CPT | Mod: XU

## 2024-04-02 PROCEDURE — 87507 IADNA-DNA/RNA PROBE TQ 12-25: CPT

## 2024-04-03 LAB — H PYLORI AG STL QL IA: NEGATIVE

## 2024-04-04 ENCOUNTER — THERAPY VISIT (OUTPATIENT)
Dept: PHYSICAL THERAPY | Facility: CLINIC | Age: 39
End: 2024-04-04
Payer: COMMERCIAL

## 2024-04-04 DIAGNOSIS — M62.89 PELVIC FLOOR DYSFUNCTION: ICD-10-CM

## 2024-04-04 DIAGNOSIS — R10.2 PERINEAL PAIN IN MALE: Primary | ICD-10-CM

## 2024-04-04 PROCEDURE — 97110 THERAPEUTIC EXERCISES: CPT | Mod: GP

## 2024-04-24 ENCOUNTER — OFFICE VISIT (OUTPATIENT)
Dept: FAMILY MEDICINE | Facility: CLINIC | Age: 39
End: 2024-04-24
Payer: COMMERCIAL

## 2024-04-24 VITALS
TEMPERATURE: 97.9 F | BODY MASS INDEX: 19.3 KG/M2 | SYSTOLIC BLOOD PRESSURE: 110 MMHG | WEIGHT: 145.6 LBS | DIASTOLIC BLOOD PRESSURE: 72 MMHG | HEIGHT: 73 IN | HEART RATE: 78 BPM | RESPIRATION RATE: 16 BRPM | OXYGEN SATURATION: 99 %

## 2024-04-24 DIAGNOSIS — F43.21 ADJUSTMENT DISORDER WITH DEPRESSED MOOD: ICD-10-CM

## 2024-04-24 DIAGNOSIS — M54.50 ACUTE BILATERAL LOW BACK PAIN WITHOUT SCIATICA: ICD-10-CM

## 2024-04-24 DIAGNOSIS — R10.2 PERINEAL PAIN IN MALE: Primary | ICD-10-CM

## 2024-04-24 DIAGNOSIS — R20.0 PERINEAL NUMBNESS: ICD-10-CM

## 2024-04-24 DIAGNOSIS — R10.31 RLQ ABDOMINAL PAIN: ICD-10-CM

## 2024-04-24 DIAGNOSIS — R19.7 DIARRHEA, UNSPECIFIED TYPE: ICD-10-CM

## 2024-04-24 PROCEDURE — 99214 OFFICE O/P EST MOD 30 MIN: CPT | Mod: GC

## 2024-04-24 NOTE — PATIENT INSTRUCTIONS
CRISIS LINES/SERVICES  If in Immediate danger please go to the ER and/or call 9-1-1  Feeling overwhelmed and just need a supportive person to talk through a struggling time?   Toll Free [phone] or text  Support  to 54969 (hours 12 PM - 10 PM CST)  The [place] Warmline provides a pcoy-wu-bbzk approach to mental health recovery, support and wellness. Calls are answered by professionally trained Certified Peer Specialists, who have first hand experience living with a mental health condition. (hours 12 PM - 10 PM CST)    Do you feel like you might be in crisis and potentially need professional services?   National Crisis Lines:  988 - National Suicide Prevention Lifeline  3-943-SNCLERG (1-535.282.2321) - www.Greatist  1-037-877-TALK (7162) - www.suicidepreventionlifeline.org  [place]:  Crisis Text Line: Text [place] to 808050. Free support at your fingertips 24/7  People who text [place] to 935324 will be connected with a counselor. Crisis Text Line is available 24 hours a day, seven days a week.  [facility] Multilingual Crisis Line:  [phone]  [facility]:  COPE - Adult (psychiatric crisis, but cannot transport self): [phone]   COPE - Child: [phone]  Acute Psychiatric Services - [facility] (24 hour crisis walk-in and crisis line): [phone]  [address] [place]  [facility] (Emergency Psychiatric Evaluation): [phone]  [facility]:  [facility] Adult Crisis Line (crisis counseling and walk-in urgent care mental health): [phone]   Adult Residential Crisis Centers:   [facility] [employer] operates two Adult Residential Crisis Centers in the area: [facility] and [facility] ([place])  These facilities are a step below in-patient hospital care, providing a three to ten-day stay for individuals who are at a moderate but not a high risk for self-harm. The crisis centers and other [employer] programs can be reached through their  central screening at [phone].  Domestic Violence:  [place] Domestic Violence Crisis Line (24-hour [place] crisis line) 1-365.348.7979    If in Immediate danger please go to the ER and/or call 9-1-1

## 2024-04-24 NOTE — PROGRESS NOTES
Assessment & Plan   RLQ abdominal pain  Diarrhea, unspecified type  Patient continuing to endorse abdominal symptoms of frequent diarrhea, bowel urgency, lower abdominal pain for 4 months. Mild RLQ tenderness to deep palpation on exam. Had a positive result for norovirus on 4/2/24 but symptoms have not improved since then. Unlikely that an infectious process has been persistently causing these symptoms for 4 months. Differential at this point includes IBD, autoimmune etiologies eg celiac disease, and IBS. Patient is agreeable to colonoscopy to further evaluate potential inflammatory causes.  - Colonosopy  - GI referral    Acute bilateral low back pain without sciatica  Perineal numbness  Perineal pain in male  Patient has been experiencing bilateral low back pain for several years. More recently reporting 4 months of testicular numbness and tingling, sometimes with a sensation of testicular retraction. Feels like this is associated with his abdominal symptoms. Constellation of findings of low back pain, bowel urgency, urinary incontinence, and testicular numbness raises concern for cauda equina syndrome.  - MRI of lumbar spine      Adjustment disorder with depressed mood  Suspect adjustment disorder with depressed mood given recent medical illnesses as a stressor leading to anhedonia and passive SI.  Protective factors include family and wife, as well as no previous attempts and no active SI or plan.  Provided mental health referral to provide resources for community mental health providers as requested by patient.  Hopeful for improvement in symptoms alongside completion of diagnostic workup, and patient has a desire to treat his symptoms through therapy.  Reevaluate PHQ-9 and YOANDY-7 at next follow-up in 4 weeks.  - Adult mental health  referral placed  - Crisis resources provided    Crisis Assessment and Follow Up Plan    PATIENT RISK ASSESSMENT:  Today Long Dixon reports passive SI. In addition,  he has notable risk factors for self-harm, including new/ worsening medical issue. However, risk is mitigated by no h/o suicide attempt, no plan or intent, no h/o risky impulsive behavior, no access to lethal means, describes a safety plan, future oriented, none to minimal alcohol use , commitment to family, good social support  , and stable housing.     Therefore, based on all available evidence including the factors cited above, he does not appear to be at imminent risk for self-harm, does not meet criteria for an emergency hold, and therefore involuntary hospitalization will not be pursued at this  time. However, based on degree of symptoms, voluntary referral for adult mental health referral was recommended, he accepted this offer.  Long Dixon was provided with the phone number for emergency mental health services and was encouraged to call these local crisis numbers, 911, or visit a local emergency room if thoughts of suicide or homicide were to arise and/or if he were to be in acute distress. The patient agreed to utilize these services as indicated if the need were to arise.      Return in about 4 weeks (around 5/22/2024) for Follow up to recheck abdominal symptoms.      Resident/Fellow Attestation   I, Tyler Johnson DO, was present with the medical/GUI student who participated in the service and in the documentation of the note.  I have verified the history and personally performed the physical exam and medical decision making.  I agree with the assessment and plan of care as documented in the note.      Tyler Johnson DO  PGY2  Date of Service (when I saw the patient): 04/24/24       Reena Alves is a 39 year old, presenting for the following health issues:  Follow Up (Follow up GI and issue issue, feel little better and still got issue)        4/24/2024     2:38 PM   Additional Questions   Roomed by msy   Accompanied by self         4/24/2024    Information    services  "provided? No     HPI   Long Dixon is a 39 year old male who returns for ongoing abdominal symptoms. Continues to endorse frequent diarrhea since 4 months ago. Describes significant bowel urgency throughout the day. Has 1-2 BM per day, followed by ongoing urge to defecate but unable to do so when he tries. No bowel incontinence. Continues to have nonspecific, crampy abdominal pain localized to the RLQ or LLQ intermittently. Experiencing nausea and a queasy sensation nearly everyday. No diarrhea or pain at night. No bloody stools. Has tried elimination diets of alcohol, caffeine, lactose without improvement in symptoms. Had an episode of prolonged watery diarrhea on Monday of this week and then experienced relief in symptoms for the following 2 days.    Also reports 4 months of testicular numbness and tingling. Feels that this is temporally associated with his abdominal symptoms. Sometimes notes that his testicles seem more retracted and this is mildly painful. He states his urinary symptoms are overall improved from prior, still occasionally has urgency and some mild incontinence. He endorses several years of bilateral low back pain. Sometimes radiates down the right leg. He used to mountain bike regularly but has cut back on this significantly since December. Does describe a sleep habit of sleeping while folded forward over his legs.    Additionally, at the end of the visit, Long expressed depressive symptoms over the past 4 months of his medical workup including anhedonia and decreased social interaction with his friends given his concern for his bowel and bladder symptoms.  He additionally endorses passive SI, that comes and goes and particularly is associated with days where his symptoms are worsened.  He denies having a plan or intent, and states that his family and wife are protective factors, and that he has had friends commit suicide and states that \"I could never do that to my family\".       Review of " "Systems  Constitutional, HEENT, cardiovascular, pulmonary, gi and gu systems are negative, except as otherwise noted.      Objective    /72   Pulse 78   Temp 97.9  F (36.6  C) (Oral)   Resp 16   Ht 1.854 m (6' 1\")   Wt 66 kg (145 lb 9.6 oz)   SpO2 99%   BMI 19.21 kg/m    Body mass index is 19.21 kg/m .  Physical Exam   GENERAL: alert and no distress  RESP: lungs clear to auscultation - no rales, rhonchi or wheezes  CV: regular rate and rhythm, normal S1 S2, no murmur, click or rub, no peripheral edema  ABDOMEN: soft, mildly tender to palpation in the RLQ, no hepatosplenomegaly, no masses  MS: no gross musculoskeletal defects noted, no edema  BACK: no CVA tenderness, no paralumbar tenderness; tenderness to palpation over the SI joint bilaterally    No results found for this or any previous visit (from the past 24 hour(s)).        Jennyfer Phillips, MS3  [employer]    Signed Electronically by: Tyler Johnson DO  "

## 2024-04-24 NOTE — PROGRESS NOTES
Preceptor Attestation:    I discussed the patient with the resident and evaluated the patient in person. I have verified the content of the note, which accurately reflects my assessment of the patient and the plan of care.   Supervising Physician:  Aurelio Walton DO.

## 2024-04-25 ENCOUNTER — THERAPY VISIT (OUTPATIENT)
Dept: PHYSICAL THERAPY | Facility: CLINIC | Age: 39
End: 2024-04-25
Payer: COMMERCIAL

## 2024-04-25 DIAGNOSIS — R10.2 PERINEAL PAIN IN MALE: Primary | ICD-10-CM

## 2024-04-25 DIAGNOSIS — M62.89 PELVIC FLOOR DYSFUNCTION: ICD-10-CM

## 2024-04-25 PROCEDURE — 97112 NEUROMUSCULAR REEDUCATION: CPT | Mod: GP

## 2024-04-25 PROCEDURE — 97110 THERAPEUTIC EXERCISES: CPT | Mod: GP

## 2024-04-29 ENCOUNTER — TELEPHONE (OUTPATIENT)
Dept: GASTROENTEROLOGY | Facility: CLINIC | Age: 39
End: 2024-04-29
Payer: COMMERCIAL

## 2024-04-29 NOTE — TELEPHONE ENCOUNTER
"Endoscopy Scheduling Screen    Have you had a positive Covid test in the last 14 days?  No    What is your communication preference for Instructions and/or Bowel Prep?   MyChart    What insurance is in the chart?  Other:  BCBS    Ordering/Referring Provider: Aurelio Walton, DO     (If ordering provider performs procedure, schedule with ordering provider unless otherwise instructed. )    BMI: Estimated body mass index is 19.21 kg/m  as calculated from the following:    Height as of 4/24/24: 1.854 m (6' 1\").    Weight as of 4/24/24: 66 kg (145 lb 9.6 oz).     Sedation Ordered  moderate sedation.   If patient BMI > 50 do not schedule in [facility].    If patient BMI > 45 do not schedule at [facility].    Are you taking methadone or Suboxone?  No    Have you had difficulties, pain, or discomfort during past endoscopy procedures?  No    Are you taking any prescription medications for pain 3 or more times per week?   NO, No RN review required.    Do you have a history of malignant hyperthermia?  No    (Females) Are you currently pregnant?   No     Have you been diagnosed or told you have pulmonary hypertension?   No    Do you have an LVAD?  No    Have you been told you have moderate to severe sleep apnea?  No    Have you been told you have COPD, asthma, or any other lung disease?  No    Do you have any heart conditions?  No     Have you ever had or are you waiting for an organ transplant?  No. Continue scheduling, no site restrictions.    Have you had a stroke or transient ischemic attack (TIA aka \"mini stroke\" in the last 6 months?   No    Have you been diagnosed with or been told you have cirrhosis of the liver?   No    Are you currently on dialysis?   No    Do you need assistance transferring?   No    BMI: Estimated body mass index is 19.21 kg/m  as calculated from the following:    Height as of 4/24/24: 1.854 m (6' 1\").    Weight as of 4/24/24: 66 kg (145 lb 9.6 oz).     Is patients BMI > 40 and scheduling location [facility]?  No    Do " you take an injectable medication for weight loss or diabetes (excluding insulin)?  No    Do you take the medication Naltrexone?  No    Do you take blood thinners?  No       Prep   Are you currently on dialysis or do you have chronic kidney disease?  No    Do you have a diagnosis of diabetes?  No    Do you have a diagnosis of cystic fibrosis (CF)?  No    On a regular basis do you go 3 -5 days between bowel movements?  No    BMI > 40?  No    Preferred Pharmacy:      [id number] [address] [address]  Phone: [phone] Fax: [phone]      Final Scheduling Details     Procedure scheduled  Colonoscopy    Surgeon:  CORNELIA     Date of procedure:  5/28     Pre-OP / PAC:   No - Not required for this site.    Location  [facility] - Patient preference.    Sedation   Moderate Sedation - Per order.      Patient Reminders:   You will receive a call from a Nurse to review instructions and health history.  This assessment must be completed prior to your procedure.  Failure to complete the Nurse assessment may result in the procedure being cancelled.      On the day of your procedure, please designate an adult(s) who can drive you home stay with you for the next 24 hours. The medicines used in the exam will make you sleepy. You will not be able to drive.      You cannot take public transportation, ride share services, or non-medical taxi service without a responsible caregiver.  Medical transport services are allowed with the requirement that a responsible caregiver will receive you at your destination.  We require that drivers and caregivers are confirmed prior to your procedure.

## 2024-05-07 ENCOUNTER — TELEPHONE (OUTPATIENT)
Dept: GASTROENTEROLOGY | Facility: CLINIC | Age: 39
End: 2024-05-07
Payer: COMMERCIAL

## 2024-05-07 NOTE — TELEPHONE ENCOUNTER
Health Call Center    Phone Message    May a detailed message be left on voicemail: Yes    Reason for Call: Other: Patient is currently scheduled on 6/26/24, as visit type New GI Urgent. This is outside the expected timeline for this referral. Patient has been added to the waitlist.      Pt declined next available ([facility]) due to location. Pt preference is [facility], [facility] or [facility] with an GUI. In person or virtual, no preference.     Action Taken: Message routed to:  Other: GI REFERRAL TRIAGE POOL     Travel Screening: Not Applicable

## 2024-05-09 ENCOUNTER — ANCILLARY PROCEDURE (OUTPATIENT)
Dept: MRI IMAGING | Facility: CLINIC | Age: 39
End: 2024-05-09
Attending: STUDENT IN AN ORGANIZED HEALTH CARE EDUCATION/TRAINING PROGRAM
Payer: COMMERCIAL

## 2024-05-09 DIAGNOSIS — M54.50 ACUTE BILATERAL LOW BACK PAIN WITHOUT SCIATICA: ICD-10-CM

## 2024-05-09 DIAGNOSIS — R20.0 PERINEAL NUMBNESS: ICD-10-CM

## 2024-05-09 DIAGNOSIS — R10.2 PERINEAL PAIN IN MALE: ICD-10-CM

## 2024-05-09 PROCEDURE — 72148 MRI LUMBAR SPINE W/O DYE: CPT

## 2024-05-10 ENCOUNTER — THERAPY VISIT (OUTPATIENT)
Dept: PHYSICAL THERAPY | Facility: CLINIC | Age: 39
End: 2024-05-10
Payer: COMMERCIAL

## 2024-05-10 DIAGNOSIS — R10.2 PERINEAL PAIN IN MALE: Primary | ICD-10-CM

## 2024-05-10 DIAGNOSIS — M62.89 PELVIC FLOOR DYSFUNCTION: ICD-10-CM

## 2024-05-10 PROCEDURE — 97530 THERAPEUTIC ACTIVITIES: CPT | Mod: GP

## 2024-05-10 PROCEDURE — 97112 NEUROMUSCULAR REEDUCATION: CPT | Mod: GP

## 2024-05-10 NOTE — PROGRESS NOTES
05/10/24 0500   Appointment Info   Signing clinician's name / credentials Tamika Yan, PT, DPT   Total/Authorized Visits E+T   Visits Used 7   Medical Diagnosis perineal pain   PT Tx Diagnosis pelvic floor weakness   Progress Note/Certification   Onset of illness/injury or Date of Surgery 12/01/23   Therapy Frequency 1x/week for 4 weeks then 1x every 2 weeks for 8 weeks   Predicted Duration 12 weeks   Progress Note Due Date 08/02/24   Progress Note Completed Date 05/10/24   PT Goal 1   Goal Identifier self care   Goal Description patient will verbalize/demonstrate pro-active self-care measures to improve pelvic floor function including bladder/bowel hygiene; activities to reduce stress and anxiety; and prescribed exercises for pelvic floor function with the assistance of educational materials from physical therapy sessions   Rationale to maximize safety and independence within the home   Goal Progress met, performing HEP   Target Date 03/28/24   Date Met 04/25/24   PT Goal 2   Goal Identifier urinary urgency   Goal Description pt will improve pelvic floor strength and coordination to reduce urinary urgency to <1x/week   Rationale to maximize safety and independence with performance of ADLs and functional tasks   Goal Progress still having urges, less leakage   Target Date 08/02/24   PT Goal 3   Goal Identifier bowel movements   Goal Description pt will have bowel movements 1-2x/day that are soft formed without straining   Rationale to maximize safety and independence with performance of ADLs and functional tasks   Goal Progress 1-4/day varrying in consistency   Target Date 08/02/24   Subjective Report   Subjective Report Pt reports he had a week with no symptoms and then slowly started to come back. Pt reports he can do all the exercises without holding his breath if he is standing but cant if he is sitting. He is having tingling in the testicles only with sitting. Having BMs 1x/day when not having sx but  feels like bowel movements trigger sx. Stool type 3-4 but more soft if having more BMs.   Treatment Interventions (PT)   Interventions Therapeutic Procedure/Exercise;Therapeutic Activity;Neuromuscular Re-education   Therapeutic Procedure/Exercise   PTRx Ther Proc 1 Prone Press Ups   PTRx Ther Proc 1 - Details HEP   Therapeutic Activity   Therapeutic Activities: dynamic activities to improve functional performance minutes (56763) 25   Ther Act 4 toileting position   Ther Act 4 - Details education on proper toileting position keeping back flat and knees above the hips   PTRx Ther Act 1 food and bowel log   PTRx Ther Act 1 - Details pt provided diary to be filled out and reviewed at next appointment   PTRx Ther Act 2 seated posture   PTRx Ther Act 2 - Details using lumbar roll to decrease slouching, to decrease tingling and pain   PTRx Ther Act 3 Proper Defecation Techniques   PTRx Ther Act 3 - Details handout provided and pt educated on normal toilet position and technique   Skilled Intervention eduation on posture and bowel habits   Patient Response/Progress pt verbalized understanding   PTRx Ther Act 4 ILU Massage   PTRx Ther Act 4 - Details HEP   PTRx Ther Act 5 Normal Bowel Habits   PTRx Ther Act 5 - Details education on normal bowel habits including eating at similar times and in similar amounts instead of variable and grazing throughout the entire day, tyring to eat less inflammatory foods like sugar and gluten, eating fruits and vegetables high in fiber   Neuromuscular Re-education   Neuromuscular re-ed of mvmt, balance, coord, kinesthetic sense, posture, proprioception minutes (84134) 20   Neuro Re-ed 1 Diaphragmatic Breathing - with Object/Weight   Neuro Re-ed 1 - Details progressing from diaphragm breathing to pushing belly out with exhale as would do for toileting maneuver   Skilled Intervention cueing to improve diaphragm and abdominal mobility and pelvic floor relaxation   Patient Response/Progress pt  demonstrated understanding with difficulty   Neuro Re-ed 2 Diaphragmatic Breathing - with Weight   Neuro Re-ed 3 Pelvic Floor Rest Position Seated   Neuro Re-ed 2 - Details working on retraining diaphragm and abodminal movement with inhale and relaxation with exhale   Neuro Re-ed 3 - Details seated with lumbar roll, working on pelvic floor relaxation and downtraining   Education   Learner/Method Patient   Plan   Home program ptrx phone   Plan for next session internal manual release and working on breathing and toileting maneuver   Total Session Time   Timed Code Treatment Minutes 45   Total Treatment Time (sum of timed and untimed services) 45           PLAN  Continue therapy per current plan of care.    Beginning/End Dates of Progress Note Reporting Period:  05/10/24 to 05/10/2024    Referring Provider:  Gustavo Goins

## 2024-05-13 NOTE — TELEPHONE ENCOUNTER
Writer called and left voice message for Pt. Writer called to help get Pt scheduled for a sooner GI appointment. Writer left call back number.    Writer will send Pt a Hang w/hart message.

## 2024-05-15 ENCOUNTER — TELEPHONE (OUTPATIENT)
Dept: GASTROENTEROLOGY | Facility: CLINIC | Age: 39
End: 2024-05-15
Payer: COMMERCIAL

## 2024-05-15 NOTE — TELEPHONE ENCOUNTER
Pre visit planning completed.      Procedure details:    Patient scheduled for Colonoscopy  on 05.28.2024.     Arrival time: 1130. Procedure time 1215    Facility location: [facility]; [place] [address]. Check in location: Main entrance at . Come through the roundabout underneath the awning (not the ER entrance).    Sedation type: Conscious sedation     Pre op exam needed? N/A    Indication for procedure:    Diarrhea, unspecified type   RLQ abdominal pain         Chart review:     Electronic implanted devices? No    Recent diagnosis of diverticulitis within the last 6 weeks? No    Diabetic? No      Medication review:    Anticoagulants? No    NSAIDS? No NSAID medications per patient's medication list.  RN will verify with pre-assessment call.    Other medication HOLDING recommendations:  N/A      Prep for procedure:     Bowel prep recommendation: Standard Miralax  Due to: standard bowel prep.    Prep instructions sent via [employer]         Mirella Sy RN  Endoscopy Procedure Pre Assessment RN  [phone] option 4

## 2024-05-16 ENCOUNTER — THERAPY VISIT (OUTPATIENT)
Dept: PHYSICAL THERAPY | Facility: CLINIC | Age: 39
End: 2024-05-16
Payer: COMMERCIAL

## 2024-05-16 DIAGNOSIS — R10.2 PERINEAL PAIN IN MALE: Primary | ICD-10-CM

## 2024-05-16 DIAGNOSIS — M62.89 PELVIC FLOOR DYSFUNCTION: ICD-10-CM

## 2024-05-16 PROCEDURE — 97110 THERAPEUTIC EXERCISES: CPT | Mod: GP

## 2024-05-16 PROCEDURE — 97140 MANUAL THERAPY 1/> REGIONS: CPT | Mod: GP

## 2024-05-16 NOTE — TELEPHONE ENCOUNTER
Attempted to contact patient in order to complete pre assessment questions.     No answer. Left message to return call to [phone] option 4    Callback required communication sent via [employer].      Mirella Marie RN  Endoscopy Procedure Pre Assessment RN

## 2024-05-17 NOTE — TELEPHONE ENCOUNTER
Pre assessment completed for upcoming procedure.   (Please see previous telephone encounter notes for complete details)    Patient returned call.       Procedure details:    Arrival time and facility location reviewed.    Pre op exam needed? N/A    Designated  policy reviewed. Instructed to have someone stay 6 hours post procedure.       Medication review:    Medications reviewed. Please see supporting documentation below. Holding recommendations discussed (if applicable).       Prep for procedure:     Patient stated they have already reviewed the bowel prep instructions and writer answered all patient questions (if applicable). Patient stated they already printed off the bowel prep instructions as well. Patient also stated their partner has Crohn's so they are familiar with the bowel prep process. NPO instructions reviewed.    Patient was instructed to call if any questions or concerns arise.       Any additional information needed:  N/A      Patient  verbalized understanding and had no questions or concerns at this time.      Bianca Salgado RN  Endoscopy Procedure Pre Assessment RN  [phone] option 4

## 2024-05-28 ENCOUNTER — HOSPITAL ENCOUNTER (OUTPATIENT)
Facility: CLINIC | Age: 39
Discharge: HOME | End: 2024-05-28
Attending: INTERNAL MEDICINE | Admitting: INTERNAL MEDICINE
Payer: COMMERCIAL

## 2024-05-28 VITALS
WEIGHT: 145 LBS | OXYGEN SATURATION: 100 % | HEIGHT: 73 IN | HEART RATE: 61 BPM | DIASTOLIC BLOOD PRESSURE: 69 MMHG | SYSTOLIC BLOOD PRESSURE: 99 MMHG | BODY MASS INDEX: 19.22 KG/M2 | RESPIRATION RATE: 12 BRPM

## 2024-05-28 LAB — COLONOSCOPY: NORMAL

## 2024-05-28 PROCEDURE — 88305 TISSUE EXAM BY PATHOLOGIST: CPT | Mod: TC | Performed by: INTERNAL MEDICINE

## 2024-05-28 PROCEDURE — 45380 COLONOSCOPY AND BIOPSY: CPT | Performed by: INTERNAL MEDICINE

## 2024-05-28 PROCEDURE — 88305 TISSUE EXAM BY PATHOLOGIST: CPT | Mod: 26 | Performed by: PATHOLOGY

## 2024-05-28 PROCEDURE — 250N000011 HC RX IP 250 OP 636: Performed by: INTERNAL MEDICINE

## 2024-05-28 PROCEDURE — G0500 MOD SEDAT ENDO SERVICE >5YRS: HCPCS | Performed by: INTERNAL MEDICINE

## 2024-05-28 PROCEDURE — 45385 COLONOSCOPY W/LESION REMOVAL: CPT | Performed by: INTERNAL MEDICINE

## 2024-05-28 PROCEDURE — 258N000003 HC RX IP 258 OP 636: Performed by: INTERNAL MEDICINE

## 2024-05-28 RX ORDER — LIDOCAINE 40 MG/G
CREAM TOPICAL
Status: DISCONTINUED | OUTPATIENT
Start: 2024-05-28 | End: 2024-05-28 | Stop reason: HOSPADM

## 2024-05-28 RX ORDER — SIMETHICONE 40MG/0.6ML
133 SUSPENSION, DROPS(FINAL DOSAGE FORM)(ML) ORAL
Status: DISCONTINUED | OUTPATIENT
Start: 2024-05-28 | End: 2024-05-28 | Stop reason: HOSPADM

## 2024-05-28 RX ORDER — EPINEPHRINE 1 MG/ML
0.1 INJECTION, SOLUTION INTRAMUSCULAR; SUBCUTANEOUS
Status: DISCONTINUED | OUTPATIENT
Start: 2024-05-28 | End: 2024-05-28 | Stop reason: HOSPADM

## 2024-05-28 RX ORDER — ONDANSETRON 2 MG/ML
4 INJECTION INTRAMUSCULAR; INTRAVENOUS
Status: DISCONTINUED | OUTPATIENT
Start: 2024-05-28 | End: 2024-05-28 | Stop reason: HOSPADM

## 2024-05-28 RX ORDER — NALOXONE HYDROCHLORIDE 0.4 MG/ML
0.4 INJECTION, SOLUTION INTRAMUSCULAR; INTRAVENOUS; SUBCUTANEOUS
Status: DISCONTINUED | OUTPATIENT
Start: 2024-05-28 | End: 2024-05-28 | Stop reason: HOSPADM

## 2024-05-28 RX ORDER — ATROPINE SULFATE 0.1 MG/ML
1 INJECTION INTRAVENOUS
Status: DISCONTINUED | OUTPATIENT
Start: 2024-05-28 | End: 2024-05-28 | Stop reason: HOSPADM

## 2024-05-28 RX ORDER — FLUMAZENIL 0.1 MG/ML
0.2 INJECTION, SOLUTION INTRAVENOUS
Status: CANCELLED | OUTPATIENT
Start: 2024-05-28 | End: 2024-05-29

## 2024-05-28 RX ORDER — PROCHLORPERAZINE MALEATE 10 MG
10 TABLET ORAL EVERY 6 HOURS PRN
Status: CANCELLED | OUTPATIENT
Start: 2024-05-28

## 2024-05-28 RX ORDER — ONDANSETRON 4 MG/1
4 TABLET, ORALLY DISINTEGRATING ORAL EVERY 6 HOURS PRN
Status: CANCELLED | OUTPATIENT
Start: 2024-05-28

## 2024-05-28 RX ORDER — NALOXONE HYDROCHLORIDE 0.4 MG/ML
0.2 INJECTION, SOLUTION INTRAMUSCULAR; INTRAVENOUS; SUBCUTANEOUS
Status: DISCONTINUED | OUTPATIENT
Start: 2024-05-28 | End: 2024-05-28 | Stop reason: HOSPADM

## 2024-05-28 RX ORDER — ONDANSETRON 2 MG/ML
4 INJECTION INTRAMUSCULAR; INTRAVENOUS EVERY 6 HOURS PRN
Status: CANCELLED | OUTPATIENT
Start: 2024-05-28

## 2024-05-28 RX ORDER — FENTANYL CITRATE 50 UG/ML
50-100 INJECTION, SOLUTION INTRAMUSCULAR; INTRAVENOUS EVERY 5 MIN PRN
Status: DISCONTINUED | OUTPATIENT
Start: 2024-05-28 | End: 2024-05-28 | Stop reason: HOSPADM

## 2024-05-28 RX ORDER — FLUMAZENIL 0.1 MG/ML
0.2 INJECTION, SOLUTION INTRAVENOUS
Status: DISCONTINUED | OUTPATIENT
Start: 2024-05-28 | End: 2024-05-28 | Stop reason: HOSPADM

## 2024-05-28 RX ORDER — DIPHENHYDRAMINE HYDROCHLORIDE 50 MG/ML
25-50 INJECTION INTRAMUSCULAR; INTRAVENOUS
Status: DISCONTINUED | OUTPATIENT
Start: 2024-05-28 | End: 2024-05-28 | Stop reason: HOSPADM

## 2024-05-28 RX ADMIN — MIDAZOLAM 1 MG: 1 INJECTION INTRAMUSCULAR; INTRAVENOUS at 11:58

## 2024-05-28 RX ADMIN — SODIUM CHLORIDE 500 ML: 9 INJECTION, SOLUTION INTRAVENOUS at 12:07

## 2024-05-28 RX ADMIN — FENTANYL CITRATE 50 MCG: 50 INJECTION, SOLUTION INTRAMUSCULAR; INTRAVENOUS at 11:58

## 2024-05-28 RX ADMIN — FENTANYL CITRATE 100 MCG: 50 INJECTION, SOLUTION INTRAMUSCULAR; INTRAVENOUS at 11:52

## 2024-05-28 RX ADMIN — MIDAZOLAM 2 MG: 1 INJECTION INTRAMUSCULAR; INTRAVENOUS at 11:52

## 2024-05-28 ASSESSMENT — ACTIVITIES OF DAILY LIVING (ADL): ADLS_ACUITY_SCORE: 35

## 2024-05-28 NOTE — H&P
Pre-Endoscopy History and Physical     Long Dixon MRN# [id number]   YOB: 1985 Age: 39 year old     Date of Procedure: 5/28/2024  Primary care provider: Tyler Johnson  Type of Endoscopy: Colonoscopy with possible biopsy, possible polypectomy  Reason for Procedure: diarrhea  Type of Anesthesia Anticipated: Conscious Sedation    HPI:    Long is a 39 year old male who will be undergoing the above procedure.      A history and physical has been performed. The patient's medications and allergies have been reviewed. The risks and benefits of the procedure and the sedation options and risks were discussed with the patient.  All questions were answered and informed consent was obtained.      He denies a personal or family history of anesthesia complications or bleeding disorders.     Patient Active Problem List   Diagnosis    Perineal pain in male    Pelvic floor dysfunction    Persistent frenulum of penis        History reviewed. No pertinent past medical history.     History reviewed. No pertinent surgical history.    Social History     Tobacco Use    Smoking status: Former     Current packs/day: 0.00     Types: Cigarettes     Quit date: 1/1/2021     Years since quitting: 3.4     Passive exposure: Never    Smokeless tobacco: Never   Substance Use Topics    Alcohol use: Yes     Comment: occasionally       Family History   Problem Relation Age of Onset    Asthma Mother     Depression Brother     Anxiety Disorder Brother     Diabetes No family hx of     Hypertension No family hx of     Colon Cancer No family hx of        Prior to Admission medications    Medication Sig Start Date End Date Taking? Authorizing Provider   clobetasol (TEMOVATE) 0.05 % external ointment Apply topically 2 times daily Apply to frenulum of the penis/foreskin, then practice the stretching exercises. 3/15/24   Gustaov Goins PA-C   famotidine (PEPCID) 40 MG tablet Take 1 tablet (40 mg) by mouth nightly as needed for heartburn  "3/20/24   Ever Mahoney MD       No Known Allergies     REVIEW OF SYSTEMS:   5 point ROS negative except as noted above in HPI, including Gen., Resp., CV, GI &  system review.    PHYSICAL EXAM:   Ht 1.854 m (6' 1\")   Wt 65.8 kg (145 lb)   BMI 19.13 kg/m   Estimated body mass index is 19.13 kg/m  as calculated from the following:    Height as of this encounter: 1.854 m (6' 1\").    Weight as of this encounter: 65.8 kg (145 lb).   GENERAL APPEARANCE: alert, and oriented  MENTAL STATUS: alert  AIRWAY EXAM: Mallampatti Class I (visualization of the soft palate, fauces, uvula, anterior and posterior pillars)  RESP: lungs clear to auscultation - no rales, rhonchi or wheezes  CV: regular rates and rhythm  DIAGNOSTICS:    Not indicated    IMPRESSION   ASA Class 2 - Mild systemic disease    PLAN:   Plan for Colonoscopy with possible biopsy, possible polypectomy. We discussed the risks, benefits and alternatives and the patient wished to proceed.    The above has been forwarded to the consulting provider.      Signed Electronically by: Mickey Hayes MD  May 28, 2024          "

## 2024-05-28 NOTE — TELEPHONE ENCOUNTER
Patient called in to verify their arrival time.  Arrival time of 11:30 am was verified with patient. Pt had billing questions.  RN transferred to billing at [phone].    Monica Alan RN

## 2024-05-29 LAB
PATH REPORT.COMMENTS IMP SPEC: NORMAL
PATH REPORT.COMMENTS IMP SPEC: NORMAL
PATH REPORT.FINAL DX SPEC: NORMAL
PATH REPORT.GROSS SPEC: NORMAL
PATH REPORT.MICROSCOPIC SPEC OTHER STN: NORMAL
PATH REPORT.RELEVANT HX SPEC: NORMAL
PHOTO IMAGE: NORMAL

## 2024-05-31 ENCOUNTER — THERAPY VISIT (OUTPATIENT)
Dept: PHYSICAL THERAPY | Facility: CLINIC | Age: 39
End: 2024-05-31
Payer: COMMERCIAL

## 2024-05-31 DIAGNOSIS — R10.2 PERINEAL PAIN IN MALE: Primary | ICD-10-CM

## 2024-05-31 DIAGNOSIS — M62.89 PELVIC FLOOR DYSFUNCTION: ICD-10-CM

## 2024-05-31 PROCEDURE — 97140 MANUAL THERAPY 1/> REGIONS: CPT | Mod: GP

## 2024-05-31 PROCEDURE — 97110 THERAPEUTIC EXERCISES: CPT | Mod: GP

## 2024-05-31 PROCEDURE — 97530 THERAPEUTIC ACTIVITIES: CPT | Mod: GP

## 2024-06-07 ENCOUNTER — THERAPY VISIT (OUTPATIENT)
Dept: PHYSICAL THERAPY | Facility: CLINIC | Age: 39
End: 2024-06-07
Payer: COMMERCIAL

## 2024-06-07 DIAGNOSIS — M62.89 PELVIC FLOOR DYSFUNCTION: ICD-10-CM

## 2024-06-07 DIAGNOSIS — R10.2 PERINEAL PAIN IN MALE: Primary | ICD-10-CM

## 2024-06-07 PROCEDURE — 97530 THERAPEUTIC ACTIVITIES: CPT | Mod: GP

## 2024-06-07 PROCEDURE — 97140 MANUAL THERAPY 1/> REGIONS: CPT | Mod: GP

## 2024-06-14 ENCOUNTER — THERAPY VISIT (OUTPATIENT)
Dept: PHYSICAL THERAPY | Facility: CLINIC | Age: 39
End: 2024-06-14
Payer: COMMERCIAL

## 2024-06-14 DIAGNOSIS — R10.2 PERINEAL PAIN IN MALE: Primary | ICD-10-CM

## 2024-06-14 DIAGNOSIS — M62.89 PELVIC FLOOR DYSFUNCTION: ICD-10-CM

## 2024-06-14 PROCEDURE — 97530 THERAPEUTIC ACTIVITIES: CPT | Mod: GP

## 2024-06-14 PROCEDURE — 97110 THERAPEUTIC EXERCISES: CPT | Mod: GP

## 2024-06-24 ENCOUNTER — THERAPY VISIT (OUTPATIENT)
Dept: PHYSICAL THERAPY | Facility: CLINIC | Age: 39
End: 2024-06-24
Payer: COMMERCIAL

## 2024-06-24 DIAGNOSIS — R10.2 PERINEAL PAIN IN MALE: Primary | ICD-10-CM

## 2024-06-24 DIAGNOSIS — M62.89 PELVIC FLOOR DYSFUNCTION: ICD-10-CM

## 2024-06-24 PROCEDURE — 97110 THERAPEUTIC EXERCISES: CPT | Mod: GP

## 2024-06-24 PROCEDURE — 97530 THERAPEUTIC ACTIVITIES: CPT | Mod: GP

## 2024-06-26 ENCOUNTER — OFFICE VISIT (OUTPATIENT)
Dept: GASTROENTEROLOGY | Facility: CLINIC | Age: 39
End: 2024-06-26
Attending: STUDENT IN AN ORGANIZED HEALTH CARE EDUCATION/TRAINING PROGRAM
Payer: COMMERCIAL

## 2024-06-26 VITALS
SYSTOLIC BLOOD PRESSURE: 117 MMHG | WEIGHT: 141.5 LBS | OXYGEN SATURATION: 100 % | BODY MASS INDEX: 18.75 KG/M2 | HEART RATE: 58 BPM | HEIGHT: 73 IN | DIASTOLIC BLOOD PRESSURE: 78 MMHG

## 2024-06-26 DIAGNOSIS — G89.29 CHRONIC ABDOMINAL PAIN: Primary | ICD-10-CM

## 2024-06-26 DIAGNOSIS — R14.0 ABDOMINAL BLOATING: ICD-10-CM

## 2024-06-26 DIAGNOSIS — R19.5 LOOSE STOOLS: ICD-10-CM

## 2024-06-26 DIAGNOSIS — R10.9 CHRONIC ABDOMINAL PAIN: Primary | ICD-10-CM

## 2024-06-26 PROCEDURE — 99204 OFFICE O/P NEW MOD 45 MIN: CPT | Performed by: PHYSICIAN ASSISTANT

## 2024-06-26 PROCEDURE — 36415 COLL VENOUS BLD VENIPUNCTURE: CPT | Performed by: PHYSICIAN ASSISTANT

## 2024-06-26 PROCEDURE — 86364 TISS TRNSGLTMNASE EA IG CLAS: CPT | Performed by: PHYSICIAN ASSISTANT

## 2024-06-26 PROCEDURE — 82784 ASSAY IGA/IGD/IGG/IGM EACH: CPT | Performed by: PHYSICIAN ASSISTANT

## 2024-06-26 ASSESSMENT — PAIN SCALES - GENERAL: PAINLEVEL: MILD PAIN (3)

## 2024-06-26 NOTE — NURSING NOTE
"Chief Complaint   Patient presents with    New Patient     New consult for abdominal pain and diarrhea.     He requests these members of his care team be copied on today's visit information:  PCP: Tyler Johnson    Referring Provider:  Aurelio Walton   [address] [place] [address]    Vitals:    06/26/24 0729   BP: 117/78   BP Location: Left arm   Patient Position: Sitting   Cuff Size: Adult Regular   Pulse: 58   SpO2: 100%   Weight: 64.2 kg (141 lb 8 oz)   Height: 1.854 m (6' 1\")     Body mass index is 18.67 kg/m .    Medications were reconciled.        Savanna Richey CMA    "

## 2024-06-26 NOTE — LETTER
6/26/2024      Long Dixon  [address] [place] [address]      Dear Colleague,    Thank you for referring your patient, Long Dixon, to the [facility]. Please see a copy of my visit note below.    NEW PATIENT GI CLINIC VISIT    CC/REFERRING MD:  Aurelio Walton  REASON FOR CONSULTATION:   Aurelio Walton for   Chief Complaint   Patient presents with     New Patient     New consult for abdominal pain and diarrhea.       ASSESSMENT/PLAN: Long Dixon is a 39-year-old male that is here for evaluation of ongoing upper and lower digestive distress symptoms that seems to occur following antibiotic therapy for suspected prostatitis.  Fortunately, he continues to deal with multiple lower urinary tract symptoms, but pelvic floor PT seems to be helping.  We spent some time reviewing his workup thus far, infectious diarrhea has been ruled out and IBD has been largely ruled out through colonoscopy, though there was some erythema in the TI and a suspect cross-sectional imaging would be beneficial to evaluate the small bowel further.  This would also allow us to evaluate the upper abdominal organs, as I would also consider cholelithiasis/biliary colic as a possibility.  We did talk about celiac disease is a condition that can contribute to digestive distress, we will check serologies today.  Lastly, given that his symptoms did start after exposure to broad-spectrum antibiotics, we talked about dysbiosis of the gut microbiome.  While we cannot directly measure this, evaluation for SIBO would be reasonable.  We also spent some time discussing postinfection IBS.  He is currently using supplemental fiber, probiotics, and simethicone as needed, which are all reasonable for now.  We will follow-up with patient after labs and imaging are completed.    1. Loose stools  - Adult GI  Referral - Consult Only  - Tissue transglutaminase nguyen IgA and IgG; Future  - IgA; Future  - Adult GI   Referral - Procedure Only; Future  - CT Abdomen Pelvis w Contrast; Future  - Tissue transglutaminase nguyen IgA and IgG  - IgA    2. Abdominal bloating  - Adult GI  Referral - Consult Only  - Tissue transglutaminase nguyen IgA and IgG; Future  - IgA; Future  - Adult GI  Referral - Procedure Only; Future  - CT Abdomen Pelvis w Contrast; Future  - Tissue transglutaminase nguyen IgA and IgG  - IgA    3. Chronic abdominal pain  - CT Abdomen Pelvis w Contrast; Future        RTC 2 months    Thank you for this consultation.  It was a pleasure to participate in the care of this patient; please contact us with any further questions.      31 minutes spent on the date of the encounter doing chart review, patient visit, and documentation    This note was created with voice recognition software, and while reviewed for accuracy, typos may remain.     Josué Bran PA-C  Division of Gastroenterology, Hepatology and Nutrition  [facility]  Long Dixon is a 39 year old male with no significant past medical history that is seen as a new patient in the GI clinic today.  His issues started this past December, developed urinary frequency and urgency, was prescribed Bactrim for presumed UTI.  Subsequent in person visit with primary care was notable for normal UA/UC.  He then started developing lower abdominal pain and loose stools.  This led to extensive laboratory workup, evaluation for thyroid disorder, labs notable for normal CBC, CMP, lipase, thyroid panel, CRP.  With the development of loose stools, he also submitted stool tests for H. pylori, C. difficile, and enteric pathogen panel, which was all negative.  In late December, he was seen again and prostatitis was suspected, and so he was started on ciprofloxacin.  This did not improve his symptoms.  He was referred to urology, had visit in late January and early February.  Pelvic floor function was  suspected and he was referred for PT for this.  Cystoscopy was also completed which was essentially normal.  He was referred for CTAP but this was not covered by insurance.  Stool tests were rechecked in April for an infection and these were again normal.  He finally underwent colonoscopy about a month ago, this was notable for some erythema in the terminal ileum, otherwise normal-appearing colon.  Biopsies from the TI and colon were both normal.    As of today, he continues to have both his ongoing lower urinary tract symptoms and recurrent abdominal pain, gas, bloating, and fluctuating bowel habits.  On some days, he will pass looser stool with a lot of gas, other days they would look more normal.  His weight has largely remained stable.  He did make a food diary for a little while and did not notice any specific correlations to foods.  He has had nausea but no vomiting.  At times has had some heartburn and regurgitation.  He has been treated with famotidine briefly.  Not currently taking any other medications.    No pertinent surgical history.  Family medical history notable for colitis in a grandparent at an advanced age.  No known colon cancer in the family.  He is a former smoker, will have a cigarette occasionally.  Has largely avoided alcohol over the past few months.    ROS:    10 point ROS neg other than the symptoms noted above in the HPI.    PREVIOUS ENDOSCOPY:  Reviewed, see HPI    PERTINENT RELEVANT IMAGING OR LABS:  Reviewed    ALLERGIES:   No Known Allergies    PERTINENT MEDICATIONS:    Current Outpatient Medications:      clobetasol (TEMOVATE) 0.05 % external ointment, Apply topically 2 times daily Apply to frenulum of the penis/foreskin, then practice the stretching exercises. (Patient not taking: Reported on 6/26/2024), Disp: 30 g, Rfl: 0     famotidine (PEPCID) 40 MG tablet, Take 1 tablet (40 mg) by mouth nightly as needed for heartburn (Patient not taking: Reported on 6/26/2024), Disp: 30  tablet, Rfl: 0    PROBLEM LIST  Patient Active Problem List    Diagnosis Date Noted     Persistent frenulum of penis 03/15/2024     Priority: Medium     Perineal pain in male 02/15/2024     Priority: Medium     Pelvic floor dysfunction 02/15/2024     Priority: Medium       PERTINENT PAST MEDICAL HISTORY:  No past medical history on file.    PREVIOUS SURGERIES:  Past Surgical History:   Procedure Laterality Date     COLONOSCOPY N/A 5/28/2024    Procedure: Colonoscopy with polypectomy using cold snare and biopsies using biopsy forceps;  Surgeon: Mickey Hayes MD;  Location: [facility]     COLONOSCOPY N/A 5/28/2024    Procedure: COLONOSCOPY, WITH POLYPECTOMY AND BIOPSY;  Surgeon: Mickey Hayes MD;  Location: [facility]       SOCIAL HISTORY:  Social History     Socioeconomic History     Marital status: Single     Spouse name: Not on file     Number of children: Not on file     Years of education: Not on file     Highest education level: Not on file   Occupational History     Not on file   Tobacco Use     Smoking status: Former     Current packs/day: 0.00     Types: Cigarettes     Quit date: 1/1/2021     Years since quitting: 3.4     Passive exposure: Never     Smokeless tobacco: Never   Vaping Use     Vaping status: Former     Start date: 1/1/2021     Quit date: 1/1/2024     Substances: Nicotine, THC, CBD, Flavoring     Devices: Disposable   Substance and Sexual Activity     Alcohol use: Not Currently     Drug use: Not on file     Sexual activity: Not on file   Other Topics Concern     Not on file   Social History Narrative     Not on file     Social Determinants of Health     Financial Resource Strain: Low Risk  (1/23/2024)    Financial Resource Strain      Within the past 12 months, have you or your family members you live with been unable to get utilities (heat, electricity) when it was really needed?: No   Food Insecurity: Low Risk  (1/23/2024)    Food Insecurity      Within the past 12 months, did you worry that  "your food would run out before you got money to buy more?: No      Within the past 12 months, did the food you bought just not last and you didn t have money to get more?: No   Transportation Needs: Low Risk  (1/23/2024)    Transportation Needs      Within the past 12 months, has lack of transportation kept you from medical appointments, getting your medicines, non-medical meetings or appointments, work, or from getting things that you need?: No   Physical Activity: Not on file   Stress: Not on file   Social Connections: Not on file   Interpersonal Safety: Low Risk  (12/11/2023)    Interpersonal Safety      Do you feel physically and emotionally safe where you currently live?: Yes      Within the past 12 months, have you been hit, slapped, kicked or otherwise physically hurt by someone?: No      Within the past 12 months, have you been humiliated or emotionally abused in other ways by your partner or ex-partner?: No   Housing Stability: Low Risk  (1/23/2024)    Housing Stability      Do you have housing? : Yes      Are you worried about losing your housing?: No       FAMILY HISTORY:  Family History   Problem Relation Age of Onset     Asthma Mother      Depression Brother      Anxiety Disorder Brother      Colitis Paternal Grandmother      Diabetes No family hx of      Hypertension No family hx of      Colon Cancer No family hx of        Past/family/social history reviewed and no changes    PHYSICAL EXAMINATION:  Constitutional: aaox3, cooperative, pleasant, not dyspneic/diaphoretic, no acute distress  Vitals reviewed: /78 (BP Location: Left arm, Patient Position: Sitting, Cuff Size: Adult Regular)   Pulse 58   Ht 1.854 m (6' 1\")   Wt 64.2 kg (141 lb 8 oz)   SpO2 100%   BMI 18.67 kg/m    Wt:   Wt Readings from Last 2 Encounters:   06/26/24 64.2 kg (141 lb 8 oz)   05/28/24 65.8 kg (145 lb)      Eyes: Sclera anicteric/injected  CV: No edema  Respiratory: Unlabored breathing  Abd: Nondistended, +bs, no " hepatosplenomegaly, nontender, no peritoneal signs  Skin: warm, perfused, no jaundice  Psych: Normal affect  MSK: Normal gait                      Again, thank you for allowing me to participate in the care of your patient.        Sincerely,        Josué Bran PA-C

## 2024-06-26 NOTE — PROGRESS NOTES
NEW PATIENT GI CLINIC VISIT    CC/REFERRING MD:  Aurelio Walton  REASON FOR CONSULTATION:   Aurelio Walton for   Chief Complaint   Patient presents with    New Patient     New consult for abdominal pain and diarrhea.       ASSESSMENT/PLAN: Long Dixon is a 39-year-old male that is here for evaluation of ongoing upper and lower digestive distress symptoms that seems to occur following antibiotic therapy for suspected prostatitis.  Fortunately, he continues to deal with multiple lower urinary tract symptoms, but pelvic floor PT seems to be helping.  We spent some time reviewing his workup thus far, infectious diarrhea has been ruled out and IBD has been largely ruled out through colonoscopy, though there was some erythema in the TI and a suspect cross-sectional imaging would be beneficial to evaluate the small bowel further.  This would also allow us to evaluate the upper abdominal organs, as I would also consider cholelithiasis/biliary colic as a possibility.  We did talk about celiac disease is a condition that can contribute to digestive distress, we will check serologies today.  Lastly, given that his symptoms did start after exposure to broad-spectrum antibiotics, we talked about dysbiosis of the gut microbiome.  While we cannot directly measure this, evaluation for SIBO would be reasonable.  We also spent some time discussing postinfection IBS.  He is currently using supplemental fiber, probiotics, and simethicone as needed, which are all reasonable for now.  We will follow-up with patient after labs and imaging are completed.    1. Loose stools  - Adult GI  Referral - Consult Only  - Tissue transglutaminase nguyen IgA and IgG; Future  - IgA; Future  - Adult GI  Referral - Procedure Only; Future  - CT Abdomen Pelvis w Contrast; Future  - Tissue transglutaminase nguyen IgA and IgG  - IgA    2. Abdominal bloating  - Adult GI  Referral - Consult Only  - Tissue transglutaminase nguyen IgA and  IgG; Future  - IgA; Future  - Adult GI  Referral - Procedure Only; Future  - CT Abdomen Pelvis w Contrast; Future  - Tissue transglutaminase nguyen IgA and IgG  - IgA    3. Chronic abdominal pain  - CT Abdomen Pelvis w Contrast; Future        RTC 2 months    Thank you for this consultation.  It was a pleasure to participate in the care of this patient; please contact us with any further questions.      31 minutes spent on the date of the encounter doing chart review, patient visit, and documentation    This note was created with voice recognition software, and while reviewed for accuracy, typos may remain.     Josué Bran PA-C  Division of Gastroenterology, Hepatology and Nutrition  [facility]  Long Dixon is a 39 year old male with no significant past medical history that is seen as a new patient in the GI clinic today.  His issues started this past December, developed urinary frequency and urgency, was prescribed Bactrim for presumed UTI.  Subsequent in person visit with primary care was notable for normal UA/UC.  He then started developing lower abdominal pain and loose stools.  This led to extensive laboratory workup, evaluation for thyroid disorder, labs notable for normal CBC, CMP, lipase, thyroid panel, CRP.  With the development of loose stools, he also submitted stool tests for H. pylori, C. difficile, and enteric pathogen panel, which was all negative.  In late December, he was seen again and prostatitis was suspected, and so he was started on ciprofloxacin.  This did not improve his symptoms.  He was referred to urology, had visit in late January and early February.  Pelvic floor function was suspected and he was referred for PT for this.  Cystoscopy was also completed which was essentially normal.  He was referred for CTAP but this was not covered by insurance.  Stool tests were rechecked in April for an infection and these were again  normal.  He finally underwent colonoscopy about a month ago, this was notable for some erythema in the terminal ileum, otherwise normal-appearing colon.  Biopsies from the TI and colon were both normal.    As of today, he continues to have both his ongoing lower urinary tract symptoms and recurrent abdominal pain, gas, bloating, and fluctuating bowel habits.  On some days, he will pass looser stool with a lot of gas, other days they would look more normal.  His weight has largely remained stable.  He did make a food diary for a little while and did not notice any specific correlations to foods.  He has had nausea but no vomiting.  At times has had some heartburn and regurgitation.  He has been treated with famotidine briefly.  Not currently taking any other medications.    No pertinent surgical history.  Family medical history notable for colitis in a grandparent at an advanced age.  No known colon cancer in the family.  He is a former smoker, will have a cigarette occasionally.  Has largely avoided alcohol over the past few months.    ROS:    10 point ROS neg other than the symptoms noted above in the HPI.    PREVIOUS ENDOSCOPY:  Reviewed, see HPI    PERTINENT RELEVANT IMAGING OR LABS:  Reviewed    ALLERGIES:   No Known Allergies    PERTINENT MEDICATIONS:    Current Outpatient Medications:     clobetasol (TEMOVATE) 0.05 % external ointment, Apply topically 2 times daily Apply to frenulum of the penis/foreskin, then practice the stretching exercises. (Patient not taking: Reported on 6/26/2024), Disp: 30 g, Rfl: 0    famotidine (PEPCID) 40 MG tablet, Take 1 tablet (40 mg) by mouth nightly as needed for heartburn (Patient not taking: Reported on 6/26/2024), Disp: 30 tablet, Rfl: 0    PROBLEM LIST  Patient Active Problem List    Diagnosis Date Noted    Persistent frenulum of penis 03/15/2024     Priority: Medium    Perineal pain in male 02/15/2024     Priority: Medium    Pelvic floor dysfunction 02/15/2024      Priority: Medium       PERTINENT PAST MEDICAL HISTORY:  No past medical history on file.    PREVIOUS SURGERIES:  Past Surgical History:   Procedure Laterality Date    COLONOSCOPY N/A 5/28/2024    Procedure: Colonoscopy with polypectomy using cold snare and biopsies using biopsy forceps;  Surgeon: Mickey Hayes MD;  Location: [facility]    COLONOSCOPY N/A 5/28/2024    Procedure: COLONOSCOPY, WITH POLYPECTOMY AND BIOPSY;  Surgeon: Mickey Hayes MD;  Location: [facility]       SOCIAL HISTORY:  Social History     Socioeconomic History    Marital status: Single     Spouse name: Not on file    Number of children: Not on file    Years of education: Not on file    Highest education level: Not on file   Occupational History    Not on file   Tobacco Use    Smoking status: Former     Current packs/day: 0.00     Types: Cigarettes     Quit date: 1/1/2021     Years since quitting: 3.4     Passive exposure: Never    Smokeless tobacco: Never   Vaping Use    Vaping status: Former    Start date: 1/1/2021    Quit date: 1/1/2024    Substances: Nicotine, THC, CBD, Flavoring    Devices: Disposable   Substance and Sexual Activity    Alcohol use: Not Currently    Drug use: Not on file    Sexual activity: Not on file   Other Topics Concern    Not on file   Social History Narrative    Not on file     Social Determinants of Health     Financial Resource Strain: Low Risk  (1/23/2024)    Financial Resource Strain     Within the past 12 months, have you or your family members you live with been unable to get utilities (heat, electricity) when it was really needed?: No   Food Insecurity: Low Risk  (1/23/2024)    Food Insecurity     Within the past 12 months, did you worry that your food would run out before you got money to buy more?: No     Within the past 12 months, did the food you bought just not last and you didn t have money to get more?: No   Transportation Needs: Low Risk  (1/23/2024)    Transportation Needs     Within the past 12 months,  "has lack of transportation kept you from medical appointments, getting your medicines, non-medical meetings or appointments, work, or from getting things that you need?: No   Physical Activity: Not on file   Stress: Not on file   Social Connections: Not on file   Interpersonal Safety: Low Risk  (12/11/2023)    Interpersonal Safety     Do you feel physically and emotionally safe where you currently live?: Yes     Within the past 12 months, have you been hit, slapped, kicked or otherwise physically hurt by someone?: No     Within the past 12 months, have you been humiliated or emotionally abused in other ways by your partner or ex-partner?: No   Housing Stability: Low Risk  (1/23/2024)    Housing Stability     Do you have housing? : Yes     Are you worried about losing your housing?: No       FAMILY HISTORY:  Family History   Problem Relation Age of Onset    Asthma Mother     Depression Brother     Anxiety Disorder Brother     Colitis Paternal Grandmother     Diabetes No family hx of     Hypertension No family hx of     Colon Cancer No family hx of        Past/family/social history reviewed and no changes    PHYSICAL EXAMINATION:  Constitutional: aaox3, cooperative, pleasant, not dyspneic/diaphoretic, no acute distress  Vitals reviewed: /78 (BP Location: Left arm, Patient Position: Sitting, Cuff Size: Adult Regular)   Pulse 58   Ht 1.854 m (6' 1\")   Wt 64.2 kg (141 lb 8 oz)   SpO2 100%   BMI 18.67 kg/m    Wt:   Wt Readings from Last 2 Encounters:   06/26/24 64.2 kg (141 lb 8 oz)   05/28/24 65.8 kg (145 lb)      Eyes: Sclera anicteric/injected  CV: No edema  Respiratory: Unlabored breathing  Abd: Nondistended, +bs, no hepatosplenomegaly, nontender, no peritoneal signs  Skin: warm, perfused, no jaundice  Psych: Normal affect  MSK: Normal gait                    "

## 2024-06-26 NOTE — PATIENT INSTRUCTIONS
It was a pleasure meeting with you today and discussing your healthcare plan. Below is a summary of what we covered:    - Blood tests today for celiac disease  - Schedule CT scan  - Schedule hydrogen breath test      Please see below for any additional questions and scheduling guidelines.    Sign up for [employer]: [employer] patient portal serves as a secure platform for accessing your medical records from the [facility]. Additionally, [employer] facilitates easy, timely, and secure messaging with your care team. If you have not signed up, you may do so by using the provided code or calling [phone].    Coordinating your care after your visit:  There are multiple options for scheduling your follow-up care based on your provider's recommendation.    How do I schedule a follow-up clinic appointment:   After your appointment, you may receive scheduling assistance with the Clinic Coordinators by having a seat in the waiting room and a Clinic Coordinator will call you up to schedule.  Virtual visits or after you leave the clinic:  Your provider has placed a follow-up order in the [employer] portal for scheduling your return appointment. A member of the scheduling team will contact you to schedule.  [facility] [employer] Scheduling: Timely scheduling through [employer] is advised to ensure appointment availability.   Call to schedule: You may schedule your follow-up appointment(s) by calling [phone], option 1.    How do I schedule my endoscopy or colonoscopy procedure:  If a procedure, such as a colonoscopy or upper endoscopy was ordered by your provider, the scheduling team will contact you to schedule this procedure. Or you may choose to call to schedule at   [phone], option 2.  Please allow 20-30 minutes when scheduling a procedure.    How do I get my blood work done? To get your blood work done, you need to schedule a lab appointment at an [facility]. There are multiple ways to schedule:   At the  clinic: The Clinic Coordinator you meet after your visit can help you schedule a lab appointment.   [facility] scheduling: [facility] offers online lab scheduling at all [facility] laboratory locations.   Call to schedule: You can call [phone] to schedule your lab appointment.    How do I schedule my imaging study: To schedule imaging studies, such as CT scans, ultrasounds, MRIs, or X-rays, contact Imaging Services at [phone].    How do I schedule a referral to another doctor: If your provider recommended a referral to another specialist(s), the referral order was placed by your provider. You will receive a phone call to schedule this referral, or you may choose to call the number attached to the referral to self-schedule.    For Post-Visit Question(s):  For any inquiries following today's visit:  Please utilize [employer] messaging and allow 48 hours for reply or contact the Call Center during normal business hours at [phone], option 3.  For Emergent After-hours questions, contact the On-Call GI Fellow through the [facility]  at [phone].  In addition, you may contact your Nurse directly using the provided contact information.    Test Results:  Test results will be accessible via [employer] in compliance with the 21st Century Cures Act. This means that your results will be available to you at the same time as your provider. Often you may see your results before your provider does. Results are reviewed by staff within two weeks with communication follow-up. Results may be released in the patient portal prior to your care team review.    Prescription Refill(s):  Medication prescribed by your provider will be addressed during your visit. For future refills, please coordinate with your pharmacy. If you have not had a recent clinic visit or routine labs, for your safety, your provider may not be able to refill your prescription.

## 2024-06-27 LAB
IGA SERPL-MCNC: 232 MG/DL (ref 84–499)
TTG IGA SER-ACNC: 0.3 U/ML
TTG IGG SER-ACNC: <0.6 U/ML

## 2024-06-27 NOTE — RESULT ENCOUNTER NOTE
Chon Alves,    Your recent blood test screening for celiac disease have come back normal.  I will be in touch when I see the results of your scan.    Please let me know if you have any questions.    Sincerely,  Josué HOWE [facility] GI, Hepatology, and Nutrition

## 2024-07-08 ENCOUNTER — HOSPITAL ENCOUNTER (OUTPATIENT)
Dept: CT IMAGING | Facility: HOSPITAL | Age: 39
Discharge: HOME | End: 2024-07-08
Attending: PHYSICIAN ASSISTANT | Admitting: PHYSICIAN ASSISTANT
Payer: COMMERCIAL

## 2024-07-08 DIAGNOSIS — R19.5 LOOSE STOOLS: ICD-10-CM

## 2024-07-08 DIAGNOSIS — R14.0 ABDOMINAL BLOATING: ICD-10-CM

## 2024-07-08 DIAGNOSIS — G89.29 CHRONIC ABDOMINAL PAIN: ICD-10-CM

## 2024-07-08 DIAGNOSIS — R10.9 CHRONIC ABDOMINAL PAIN: ICD-10-CM

## 2024-07-08 PROCEDURE — 74177 CT ABD & PELVIS W/CONTRAST: CPT

## 2024-07-08 PROCEDURE — 250N000011 HC RX IP 250 OP 636: Performed by: PHYSICIAN ASSISTANT

## 2024-07-08 RX ORDER — IOPAMIDOL 755 MG/ML
75 INJECTION, SOLUTION INTRAVASCULAR ONCE
Status: COMPLETED | OUTPATIENT
Start: 2024-07-08 | End: 2024-07-08

## 2024-07-08 RX ADMIN — IOPAMIDOL 75 ML: 755 INJECTION, SOLUTION INTRAVENOUS at 18:32

## 2024-07-09 NOTE — RESULT ENCOUNTER NOTE
Tong Alves,    The report from your recent CT scan is available for you to review.  Fortunately, everything is looking pretty normal.  We had discussed doing a hydrogen breath test to evaluate for small intestinal bacterial overgrowth, which may be a contributor to your symptoms.  If you are interested in this, please let me know and I will order it.    Please let me know if you have any questions.    Sincerely,  Josué HOWE [facility] GI, Hepatology, and Nutrition

## 2024-07-22 ENCOUNTER — THERAPY VISIT (OUTPATIENT)
Dept: PHYSICAL THERAPY | Facility: CLINIC | Age: 39
End: 2024-07-22
Payer: COMMERCIAL

## 2024-07-22 DIAGNOSIS — M62.89 PELVIC FLOOR DYSFUNCTION: ICD-10-CM

## 2024-07-22 DIAGNOSIS — R10.2 PERINEAL PAIN IN MALE: Primary | ICD-10-CM

## 2024-07-22 PROCEDURE — 97530 THERAPEUTIC ACTIVITIES: CPT | Mod: GP

## 2024-07-22 NOTE — PROGRESS NOTES
07/22/24 0500   Appointment Info   Signing clinician's name / credentials Tamika Yan, PT, DPT   Total/Authorized Visits E+T   Visits Used 13   Medical Diagnosis perineal pain   PT Tx Diagnosis pelvic floor weakness   Progress Note/Certification   Onset of illness/injury or Date of Surgery 12/01/23   Therapy Frequency 1x/week for 4 weeks then 1x every 2 weeks for 8 weeks   Predicted Duration 12 weeks   Progress Note Due Date 08/02/24   Progress Note Completed Date 05/10/24   PT Goal 1   Goal Identifier self care   Goal Description patient will verbalize/demonstrate pro-active self-care measures to improve pelvic floor function including bladder/bowel hygiene; activities to reduce stress and anxiety; and prescribed exercises for pelvic floor function with the assistance of educational materials from physical therapy sessions   Rationale to maximize safety and independence within the home   Goal Progress met, performing HEP   Target Date 03/28/24   Date Met 04/25/24   PT Goal 2   Goal Identifier urinary urgency   Goal Description pt will improve pelvic floor strength and coordination to reduce urinary urgency to <1x/week   Rationale to maximize safety and independence with performance of ADLs and functional tasks   Goal Progress still having urges, less leakage   Target Date 08/02/24   PT Goal 3   Goal Identifier bowel movements   Goal Description pt will have bowel movements 1-2x/day that are soft formed without straining   Rationale to maximize safety and independence with performance of ADLs and functional tasks   Goal Progress variable   Target Date 08/02/24   Subjective Report   Subjective Report Pt reports he is doing about the same. He still has ups and downs. Tingling in morning then mellows midday but still feels like he has to pee. Best in evening until getting ready for bed. Has been doint wand once a week and exercises daily. He reports the breathing is mildly painful since being sick and throwing  up a few weeks ago. Pelvic wand gives some short term relief. Still not having regular BMs.   Treatment Interventions (PT)   Interventions Therapeutic Activity   Therapeutic Activity   Therapeutic Activities: dynamic activities to improve functional performance minutes (99140) 30   Ther Act 1 rectal dilator   Ther Act 1 - Details continue using, try higher frequency to see if that helps prolong sx relief   Ther Act 2 pt education   Ther Act 2 - Details pt education on options moving forward, discussed following up with MD, possible accupuncture/dry needling, follow up with PT if needed, could try rectal balloon   Ther Act 3 diaphragm release   Ther Act 3 - Details supine and seated with deep breathing, pr reported minor improvement in abdominal pain, still central abdominal pain   Patient Response/Progress pt verbalized understanding   Education   Learner/Method Patient   Plan   Home program ptrx phone   Plan for next session discharge   Total Session Time   Timed Code Treatment Minutes 30   Total Treatment Time (sum of timed and untimed services) 30           DISCHARGE  Reason for Discharge: Pt discharged due to lack of progress. Some progress made but still having many sx. Pt following up with Gastro and PCP.    Equipment Issued: HEP    Discharge Plan: Patient to continue home program.    Referring Provider:  Gustavo Goins

## 2024-08-22 ENCOUNTER — TELEPHONE (OUTPATIENT)
Dept: GASTROENTEROLOGY | Facility: CLINIC | Age: 39
End: 2024-08-22
Payer: COMMERCIAL

## 2024-08-22 NOTE — TELEPHONE ENCOUNTER
Non-Invasive GI Procedure Scheduling Questionnaire    Scheduling Reminders:  Add-on within 2 weeks require clinic approval (Manometry & HBT)  Manometry requires an in-person interrupter (not needed for HBT)      Have you had a positive Covid test in the last 14 days?  No    Are you active on [employer]?   Yes    What insurance is in the chart?  Other:  [employer]    Ordering/Referring Provider: Josué Bran   (If ordering provider performs procedure, schedule with ordering provider unless otherwise instructed. )    (Females) Are you currently pregnant? No - Okay to continue scheduling.    Have you ever had or are you awaiting a heart or lung transplant? No - Schedule next available.    Do you need assistance transferring? No - Continue scheduling.    Do you take acid reflux medication or proton-pump inhibitors (PPI)? No - Continue scheduling.    Patient Reminders:  Please inform patients to review instructions sent via [employer] or letter two weeks before procedure.  The Manometry exam may take up to 90 minutes.  The Breath Test exam may take up to 4 hours.

## 2024-09-10 ENCOUNTER — OFFICE VISIT (OUTPATIENT)
Dept: FAMILY MEDICINE | Facility: CLINIC | Age: 39
End: 2024-09-10
Payer: COMMERCIAL

## 2024-09-10 VITALS
DIASTOLIC BLOOD PRESSURE: 75 MMHG | BODY MASS INDEX: 18.47 KG/M2 | TEMPERATURE: 97.7 F | WEIGHT: 139.4 LBS | RESPIRATION RATE: 24 BRPM | HEIGHT: 73 IN | SYSTOLIC BLOOD PRESSURE: 118 MMHG | OXYGEN SATURATION: 99 % | HEART RATE: 59 BPM

## 2024-09-10 DIAGNOSIS — N48.89 PENILE PAIN: ICD-10-CM

## 2024-09-10 DIAGNOSIS — R10.84 ABDOMINAL PAIN, GENERALIZED: Primary | ICD-10-CM

## 2024-09-10 PROCEDURE — 99214 OFFICE O/P EST MOD 30 MIN: CPT

## 2024-09-10 RX ORDER — ESCITALOPRAM OXALATE 5 MG/1
5 TABLET ORAL DAILY
Qty: 60 TABLET | Refills: 0 | Status: SHIPPED | OUTPATIENT
Start: 2024-09-10

## 2024-09-10 NOTE — PROGRESS NOTES
Preceptor Attestation:    I discussed the patient with the resident and evaluated the patient in person. I have verified the content of the note, which accurately reflects my assessment of the patient and the plan of care.   Supervising Physician:  Venkat Enriquez MD.

## 2024-09-10 NOTE — PATIENT INSTRUCTIONS
Thank you for visiting our clinic today. As we discussed;    1) I've sent a referral to our mental health team to search for psychotherapy that is focused on anxiety and IBS, they'll reach out to you with options  2) Follow up with me in 4 weeks to see how your medication is working  3) Make sure to follow up for your breath test!    Please feel free to call the clinic with any questions or concerns at [phone], or send a [employer] message and we will get back to you as soon as we can.      Tyler Johnson, DO

## 2024-09-10 NOTE — PROGRESS NOTES
Assessment & Plan     Abdominal pain, generalized  Penile pain  Extensive workup so far negative including imaging and a variety of lab studies, scheduled for urea breath test with GI to evaluate for SIBO.  Given the association of his symptoms with the stress he is feeling in his life, concerned there is some gut brain axis dysfunction as well as a somaticization of his anxiety.  Saw family member once for some psychotherapy but has not been consistently evaluated, not on any psychotropic medications.  Patient is very open to having a health systems oriented therapist and is willing to try low-dose Lexapro.  In the future, can attempt further rheumatologic evaluation with [facility], repeat ESR and CRP, but so far there has been no clear diagnosis identified through GI or urology specialist.  Recommended patient continue pelvic floor exercises as these have provided some mild relief of his penile pain. Negative abdominal exam today.  - Adult Mental Health  Referral; Future  - escitalopram (LEXAPRO) 5 MG tablet; Take 1 tablet (5 mg) by mouth daily.      Return in about 2 weeks (around 9/24/2024).    Tyler Johnson, DO [facility]   Long is a 39 year old, presenting for the following health issues:  RECHECK (Prostate and incontinence issues. //Patient's insurance may change - looking to have a plan of care if their insurance changes. )        9/10/2024     8:45 AM   Additional Questions   Roomed by AKIRA marte MA   Accompanied by Self         9/10/2024    Information    services provided? No        HPI     Long Dixon is a 39 year old male who returns for ongoing abdominal symptoms.     He was initially seen in clinic on 12/11/2023 for dysuria, left-sided abdominal pain, with UA and culture returning negative for any specific pathogen.  FERNANDEZ was performed at follow-up which was boggy and tender to palpation, he was suspected to have prostatitis and was given a course of Levaquin and  "referral to urology.    He has followed up with urology since then for perennial pain and pelvic floor weakness, and had a cystoscopy performed which was negative except for a tight urethral sphincter.  He was recommended pelvic floor PT and is continue to do his exercises since.    During this time he also developed waxing waning loose stools, bloating or abdominal tightness with generalized abdominal pain, and was referred to GI for further evaluation.  Negative stool panel, negative H. pylori, negative TTG, normal IgA levels, a colonoscopy was performed that showed an area of mild erythema with biopsies of this area returning negative for any acute pathology.  CT imaging was also performed of the abdomen pelvis that did not show any acute pathology of the small or large bowel or upper abdominal organs.  Now planning to evaluate for SIBO with urea breath test.  Some suspicion of postinfection IBS, and he is currently using supplemental fiber, probiotics, and simethicone as needed.    He returns to clinic today as his insurance is changed and he wants to make sure that his follow-up with his primary care provider or specialist would still be covered.    He feels that his urinary symptoms have somewhat improved, now only complaining of a distinct pressure at the very tip of his penis.  Feels that his abdominal symptoms wax and wane but are very significantly associated with stress and anxiety -he will have a more tight abdomen, \"butt sweats\", and constipation or looser stools.  He feels that he has to mentally like all of his abdominal muscles in order to have a bowel movement.  He learned breathing exercises from PT that he uses in the mornings that provide some relief.  Recently changed jobs and has had a significant improvement in his work stress, and states the day he found out he got the job he had absolutely no symptoms.    Has cut out alcohol, trialed multiple different food groups to evaluate for association " "of symptoms and has not found none.    Objective    /75   Pulse 59   Temp 97.7  F (36.5  C) (Oral)   Resp 24   Ht 1.854 m (6' 1\")   Wt 63.2 kg (139 lb 6.4 oz)   SpO2 99%   BMI 18.39 kg/m    Body mass index is 18.39 kg/m .  Physical Exam   GENERAL: alert and no distress  NECK: no adenopathy, no asymmetry, masses, or scars  RESP: lungs clear to auscultation - no rales, rhonchi or wheezes  CV: regular rate and rhythm, normal S1 S2, no S3 or S4, no murmur, click or rub, no peripheral edema  ABDOMEN: soft, nontender, no hepatosplenomegaly, no masses and bowel sounds normal  MS: no gross musculoskeletal defects noted, no edema        Signed Electronically by: Tyler Johnson DO  "

## 2024-10-08 ENCOUNTER — OFFICE VISIT (OUTPATIENT)
Dept: FAMILY MEDICINE | Facility: CLINIC | Age: 39
End: 2024-10-08
Payer: COMMERCIAL

## 2024-10-08 VITALS
OXYGEN SATURATION: 99 % | SYSTOLIC BLOOD PRESSURE: 119 MMHG | BODY MASS INDEX: 19.48 KG/M2 | DIASTOLIC BLOOD PRESSURE: 78 MMHG | TEMPERATURE: 98.3 F | HEIGHT: 73 IN | WEIGHT: 147 LBS | RESPIRATION RATE: 20 BRPM | HEART RATE: 69 BPM

## 2024-10-08 DIAGNOSIS — R10.84 ABDOMINAL PAIN, GENERALIZED: Primary | ICD-10-CM

## 2024-10-08 DIAGNOSIS — R10.2 PERINEAL PAIN IN MALE: ICD-10-CM

## 2024-10-08 PROCEDURE — 99214 OFFICE O/P EST MOD 30 MIN: CPT | Mod: GC

## 2024-10-08 RX ORDER — ESCITALOPRAM OXALATE 5 MG/1
5 TABLET ORAL DAILY
Qty: 60 TABLET | Refills: 2 | Status: SHIPPED | OUTPATIENT
Start: 2024-10-08

## 2024-10-08 NOTE — Clinical Note
Hi Dr Chapin,  I previously placed a mental health referral for this patient - unfortunately he does not have insurance due to a change in jobs, but would appreciate if you reached out in November once his new insurance kicks in and discuss establishing mental health provider in our clinic or to provide community resources. I appreciate all your help with this case!  Thank you, and let me know how I can help.  Dimitri

## 2024-10-08 NOTE — PROGRESS NOTES
"  Assessment & Plan     Abdominal pain, generalized  Perineal pain in male  Improvement since starting escitalopram.  Suspect the etiology of his longstanding symptoms now more likely to be due to somaticization of anxiety, some component of gut brain axis dysfunction.  No concerns for infection.  Other workup has been largely negative including CT scan and diagnostic colonoscopy.  He has urea breath test scheduled for 11/5/2024 which will evaluate for SIBO.  Continue Lexapro, provided good Rx coupon.  - escitalopram (LEXAPRO) 5 MG tablet; Take 1 tablet (5 mg) by mouth daily.    Return in about 3 months (around 1/8/2025).    Tyler Johnson, DO [facility]   Long is a 39 year old, presenting for the following health issues:  Follow Up (Follow up on med changing last time )      10/8/2024     8:11 AM   Additional Questions   Roomed by radha   Accompanied by self         10/8/2024    Information    services provided? No        HPI     Long Dixon is a pleasant 39-year-old male presenting to the clinic for follow-up of his chronic GI and  symptoms.  At previous visit discussed how workup has been largely negative, and offered trial of SSRI for suspected physical manifestations of anxiety or gut brain axis dysfunction.    Since starting the medication he feels overall improved.  Feels less pressure in his perennial, still occasionally occurs but is \" more abbreviated\" and less intense.  He feels that since starting any work from home job his symptoms have improved as well.  Symptoms are overall less intense and do not last as long.  1 episode of diarrhea this week.  Still feeling sweating in the perennial area but this is improved as well.  Denies any side effects to the medication.  Feels that for the first 2 weeks while on it he felt kind of disoriented but this has since resolved and he feels stable.  Since starting his new job he has had a lapse of insurance and it will not be " "reinstated until November.        Objective    /78   Pulse 69   Temp 98.3  F (36.8  C) (Oral)   Resp 20   Ht 1.854 m (6' 1\")   Wt 66.7 kg (147 lb)   SpO2 99%   BMI 19.39 kg/m    Body mass index is 19.39 kg/m .  Physical Exam   GENERAL: Active, alert, in no acute distress. Well appearing without discomfort.  SKIN: Clear. No significant rash, abnormal pigmentation or lesions  HEAD: Normocephalic.  NOSE: Normal without discharge.  NECK: Supple, no masses.  LUNGS: Breathing comfortably on room air without accessory muscle use.  HEART: Appears well perfused.        Signed Electronically by: Tyler Johnson DO    "

## 2024-10-29 ENCOUNTER — TELEPHONE (OUTPATIENT)
Dept: GASTROENTEROLOGY | Facility: CLINIC | Age: 39
End: 2024-10-29
Payer: COMMERCIAL

## 2024-10-29 NOTE — TELEPHONE ENCOUNTER
Left message with patient to confirm appointment on 11/5/2024 @ 9:30 am. Left detailed message with prep/ instructions. My chart message sent with instructions on 8/22/2024 (read on 8/22/24) and reminder sent 10/22/2024 (not read) and 10/29/2024. Asked patient to reply to confirm appointment.     Maria R Colunga LPN

## 2024-11-05 ENCOUNTER — OFFICE VISIT (OUTPATIENT)
Dept: GASTROENTEROLOGY | Facility: CLINIC | Age: 39
End: 2024-11-05
Payer: COMMERCIAL

## 2024-11-05 VITALS
OXYGEN SATURATION: 98 % | DIASTOLIC BLOOD PRESSURE: 77 MMHG | SYSTOLIC BLOOD PRESSURE: 122 MMHG | RESPIRATION RATE: 15 BRPM | HEART RATE: 65 BPM

## 2024-11-05 DIAGNOSIS — R14.0 ABDOMINAL BLOATING: ICD-10-CM

## 2024-11-05 DIAGNOSIS — R19.5 LOOSE STOOLS: ICD-10-CM

## 2024-11-05 PROCEDURE — 91065 BREATH HYDROGEN/METHANE TEST: CPT | Performed by: INTERNAL MEDICINE

## 2024-11-05 ASSESSMENT — PAIN SCALES - GENERAL: PAINLEVEL_OUTOF10: NO PAIN (0)

## 2024-11-05 NOTE — PATIENT INSTRUCTIONS
Hydrogen Breath Test  1. You may experience diarrhea for the next 12-24 hours.  2. Resume a regular diet.  3. Follow-up with your referring doctor in clinic.  4. If you have questions call [phone] from 7:00am-5:00pm.

## 2024-11-05 NOTE — PROGRESS NOTES
Non-Invasive GI Procedure Visit    Long Dixon is a 39 year old male with history of    Loose stools  Abdominal bloating.   Patient stated reason for procedure: Abdominal Pain      COVID-19 PCR Results           No data to display              COVID-19 Antibody Results, Testing for Immunity           No data to display                Pre-Procedure Assessment  Patient presents to clinic today for Hydrogen Breath Test    Referring Provider: Josué Bran PA-C    Previous HBT: No  Is patient a smoker: No    Does patient report having a colonoscopy, barium study, barium enema or taking antibiotics within the last 2 weeks? Yes / No: No.  Does patient report taking a stool softener, fiber supplement, laxative or anti-diarrheal in the last 3 - 4 days? Yes / No: No.  Does the patient report taking a probiotic in the last 1 - 2 days? Yes / No: No.  Does the patient report taking any medications today? No    Does the patient report following the pre-procedure bland diet? Yes  Does patient report being NPO for a minimum of 12 hours before the test? Yes.     Patient reported symptoms:Abdominal Pain  How long has patient had these symptoms? Almost a year    Patient Hx  Patient's history, medications and allergies were reviewed.     Height: Data Unavailable   Weight: 0 lbs 0 oz    Patient Active Problem List    Diagnosis Date Noted    Persistent frenulum of penis 03/15/2024     Priority: Medium      Prior to Admission medications    Medication Sig Start Date End Date Taking? Authorizing Provider   clobetasol (TEMOVATE) 0.05 % external ointment Apply topically 2 times daily Apply to frenulum of the penis/foreskin, then practice the stretching exercises.  Patient not taking: Reported on 6/26/2024 3/15/24   Gustavo Goins PA-C   escitalopram (LEXAPRO) 5 MG tablet Take 1 tablet (5 mg) by mouth daily. 10/8/24   Venkat Enriquez MD   famotidine (PEPCID) 40 MG tablet Take 1 tablet (40 mg) by mouth nightly as needed for  heartburn  Patient not taking: Reported on 6/26/2024 3/20/24   Ever Mahoney MD     No Known Allergies  No past medical history on file.  Past Surgical History:   Procedure Laterality Date    COLONOSCOPY N/A 5/28/2024    Procedure: Colonoscopy with polypectomy using cold snare and biopsies using biopsy forceps;  Surgeon: Mickey Hayes MD;  Location:  GI    COLONOSCOPY N/A 5/28/2024    Procedure: COLONOSCOPY, WITH POLYPECTOMY AND BIOPSY;  Surgeon: Mickey Hayes MD;  Location:  GI     Family History   Problem Relation Age of Onset    Asthma Mother     Depression Brother     Anxiety Disorder Brother     Colitis Paternal Grandmother     Diabetes No family hx of     Hypertension No family hx of     Colon Cancer No family hx of      Social History     Tobacco Use    Smoking status: Former     Current packs/day: 0.00     Types: Cigarettes     Quit date: 1/1/2021     Years since quitting: 3.8     Passive exposure: Never    Smokeless tobacco: Never   Substance Use Topics    Alcohol use: Not Currently        Pre-Procedure Education & Consent  Procedure education was provided to: Patient  Teaching method: Explanation  Barriers to learning: No Barrier    Patient indicated understanding of pre-procedure instruction and appropriate consent was obtained and documented.    ____________________________________________________________________    Post-Procedure Documentation: Hydrogen Breath Test     Baseline breath obtained prior to patient drinking Lactulose.     Patient tolerated test with no complaints.    HBT Results    Sample Clock Times Ppm H2 Ppm CH4 CO2% Comments   Baseline 0939 1 4 4.2     Challenge Dose Given 0940 - - - -   #1 0955 4 4 3.7     #2 1010 6 4 3.8     #3 1025 8 5 4.1     #4 1040 23 6 3.6     #5 1055 39 7 4.2     #6 1110 77 9 3.7     #7 1125 92 10 3.7     #8 1140 130 10 3.8     #9 1155 130 10 3.7       #  B   Patient given discharge instructions.    Notification of pending test results sent to  provider for interpretation. Please reference scanned document for final interpretation of results. Patient will follow up with referring provider for test results.    Nikos Newby RN on 11/5/2024 at 9:32 AM

## 2024-11-11 NOTE — RESULT ENCOUNTER NOTE
Tong Alves,    The report from your recent hydrogen breath test is available for you to review.  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  The recommended treatment is a course of antibiotics, typically we do a combination of neomycin and rifaximin.  These are both taken daily for 2 weeks.  They are generally well-tolerated and can be effective in 60% to 70% of cases.  If you are interested in trying these medications, please let me know.    Sincerely,  Josué Bran PA-C  [facility]

## 2024-11-16 ENCOUNTER — MYC MEDICAL ADVICE (OUTPATIENT)
Dept: GASTROENTEROLOGY | Facility: CLINIC | Age: 39
End: 2024-11-16
Payer: COMMERCIAL

## 2024-11-16 DIAGNOSIS — K63.8219 SMALL INTESTINAL BACTERIAL OVERGROWTH (SIBO): Primary | ICD-10-CM

## 2024-11-18 RX ORDER — NEOMYCIN SULFATE 500 MG/1
500 TABLET ORAL 2 TIMES DAILY
Qty: 28 TABLET | Refills: 0 | Status: SHIPPED | OUTPATIENT
Start: 2024-11-18

## 2024-12-26 ENCOUNTER — MYC MEDICAL ADVICE (OUTPATIENT)
Dept: GASTROENTEROLOGY | Facility: CLINIC | Age: 39
End: 2024-12-26
Payer: COMMERCIAL

## 2024-12-26 DIAGNOSIS — K63.8219 SMALL INTESTINAL BACTERIAL OVERGROWTH (SIBO): ICD-10-CM

## 2024-12-30 RX ORDER — NEOMYCIN SULFATE 500 MG/1
500 TABLET ORAL 2 TIMES DAILY
Qty: 28 TABLET | Refills: 0 | Status: SHIPPED | OUTPATIENT
Start: 2024-12-30

## 2025-01-19 ENCOUNTER — HEALTH MAINTENANCE LETTER (OUTPATIENT)
Age: 40
End: 2025-01-19

## (undated) DEVICE — ENDO FORCEP ENDOJAW BIOPSY 2.8MMX230CM FB-220U

## (undated) DEVICE — ENDO SNARE EXACTO COLD 9MM LOOP 2.4MMX230CM 00711115

## (undated) DEVICE — KIT ENDO TURNOVER/PROCEDURE W/CLEAN A SCOPE LINERS 103888

## (undated) DEVICE — QUICK TRAP

## (undated) RX ORDER — LIDOCAINE HYDROCHLORIDE 20 MG/ML
JELLY TOPICAL
Status: DISPENSED
Start: 2024-02-07

## (undated) RX ORDER — FENTANYL CITRATE 50 UG/ML
INJECTION, SOLUTION INTRAMUSCULAR; INTRAVENOUS
Status: DISPENSED
Start: 2024-05-28